# Patient Record
Sex: FEMALE | Race: WHITE | NOT HISPANIC OR LATINO | Employment: FULL TIME | URBAN - METROPOLITAN AREA
[De-identification: names, ages, dates, MRNs, and addresses within clinical notes are randomized per-mention and may not be internally consistent; named-entity substitution may affect disease eponyms.]

---

## 2023-03-14 LAB
CLUE CELLS: PRESENT
NUGENT SCORE: 7
YEAST: PRESENT

## 2023-03-15 LAB
CHLAMYDIA TRACH., AMPLIFIED: NEGATIVE
N. GONORRHEA, AMPLIFIED: NEGATIVE
TRICHOMONAS VAGINALIS: NEGATIVE

## 2023-09-30 PROBLEM — B96.81 HELICOBACTER POSITIVE GASTRITIS: Status: ACTIVE | Noted: 2023-09-30

## 2023-09-30 PROBLEM — M21.40 FLAT FOOT: Status: ACTIVE | Noted: 2023-09-30

## 2023-09-30 PROBLEM — F54 PSYCHOLOGICAL FACTORS AFFECTING MORBID OBESITY (MULTI): Status: ACTIVE | Noted: 2023-09-30

## 2023-09-30 PROBLEM — D50.9 IDA (IRON DEFICIENCY ANEMIA): Status: ACTIVE | Noted: 2023-09-30

## 2023-09-30 PROBLEM — G56.01 CARPAL TUNNEL SYNDROME, RIGHT UPPER LIMB: Status: ACTIVE | Noted: 2023-09-30

## 2023-09-30 PROBLEM — E51.9 THIAMIN DEFICIENCY: Status: ACTIVE | Noted: 2023-09-30

## 2023-09-30 PROBLEM — N92.6 ABNORMAL BLEEDING IN MENSTRUAL CYCLE: Status: ACTIVE | Noted: 2023-09-30

## 2023-09-30 PROBLEM — E78.5 HYPERLIPIDEMIA: Status: ACTIVE | Noted: 2023-09-30

## 2023-09-30 PROBLEM — E66.01 PSYCHOLOGICAL FACTORS AFFECTING MORBID OBESITY (MULTI): Status: ACTIVE | Noted: 2023-09-30

## 2023-09-30 PROBLEM — E55.9 VITAMIN D DEFICIENCY: Status: ACTIVE | Noted: 2023-09-30

## 2023-09-30 PROBLEM — G56.02 CARPAL TUNNEL SYNDROME, LEFT UPPER LIMB: Status: ACTIVE | Noted: 2023-09-30

## 2023-09-30 PROBLEM — R63.8 ALTERATION IN NUTRITION: Status: ACTIVE | Noted: 2023-09-30

## 2023-09-30 PROBLEM — L85.1 ACQUIRED PLANTAR KERATODERMA: Status: ACTIVE | Noted: 2023-09-30

## 2023-09-30 PROBLEM — K29.70 HELICOBACTER POSITIVE GASTRITIS: Status: ACTIVE | Noted: 2023-09-30

## 2023-09-30 PROBLEM — M20.5X9 ACQUIRED CURLY TOE: Status: ACTIVE | Noted: 2023-09-30

## 2023-09-30 PROBLEM — R73.03 PRE-DIABETES: Status: ACTIVE | Noted: 2023-09-30

## 2023-09-30 PROBLEM — G47.30 SLEEP APNEA: Status: ACTIVE | Noted: 2023-09-30

## 2023-09-30 PROBLEM — E66.01 OBESITY, MORBID (MORE THAN 100 LBS OVER IDEAL WEIGHT OR BMI > 40) (MULTI): Status: ACTIVE | Noted: 2023-09-30

## 2023-09-30 PROBLEM — E61.1 IRON DEFICIENCY: Status: ACTIVE | Noted: 2023-09-30

## 2023-09-30 RX ORDER — FERROUS SULFATE 325(65) MG
TABLET ORAL 2 TIMES DAILY
COMMUNITY
Start: 2021-03-26 | End: 2023-10-19 | Stop reason: WASHOUT

## 2023-09-30 RX ORDER — OMEPRAZOLE 20 MG/1
CAPSULE, DELAYED RELEASE ORAL
COMMUNITY
End: 2023-10-19 | Stop reason: WASHOUT

## 2023-09-30 RX ORDER — ASCORBIC ACID 500 MG
TABLET,CHEWABLE ORAL
COMMUNITY
Start: 2021-03-26 | End: 2023-10-19 | Stop reason: WASHOUT

## 2023-09-30 RX ORDER — FLUCONAZOLE 150 MG/1
TABLET ORAL
COMMUNITY
Start: 2023-03-16 | End: 2023-10-19 | Stop reason: ALTCHOICE

## 2023-09-30 RX ORDER — OMEPRAZOLE 40 MG/1
CAPSULE, DELAYED RELEASE ORAL
COMMUNITY
Start: 2021-03-26 | End: 2023-10-19 | Stop reason: WASHOUT

## 2023-09-30 RX ORDER — ERGOCALCIFEROL 1.25 MG/1
CAPSULE ORAL
COMMUNITY
Start: 2021-03-12 | End: 2023-10-19 | Stop reason: WASHOUT

## 2023-09-30 RX ORDER — PNV NO.95/FERROUS FUM/FOLIC AC 28MG-0.8MG
1 TABLET ORAL DAILY
COMMUNITY

## 2023-09-30 RX ORDER — ASCORBIC ACID 250 MG
TABLET,CHEWABLE ORAL 2 TIMES DAILY
COMMUNITY
Start: 2021-03-26 | End: 2023-10-19 | Stop reason: WASHOUT

## 2023-09-30 RX ORDER — VITAMINS AND MINERALS 1; 1000; 100; 400; 30; 3; 3; 15; 20; 12; 7; 200; 29; 20 MG/1; [IU]/1; MG/1; [IU]/1; [IU]/1; MG/1; MG/1; MG/1; MG/1; UG/1; MG/1; MG/1; MG/1; MG/1
TABLET, CHEWABLE ORAL
COMMUNITY
Start: 2021-09-16 | End: 2023-10-19 | Stop reason: WASHOUT

## 2023-09-30 RX ORDER — NORGESTIMATE AND ETHINYL ESTRADIOL 7DAYSX3 28
1 KIT ORAL DAILY
COMMUNITY
End: 2023-10-19 | Stop reason: WASHOUT

## 2023-09-30 RX ORDER — LIDOCAINE HYDROCHLORIDE 20 MG/ML
INJECTION, SOLUTION EPIDURAL; INFILTRATION; INTRACAUDAL; PERINEURAL
COMMUNITY
Start: 2022-04-25 | End: 2023-10-19 | Stop reason: ALTCHOICE

## 2023-09-30 RX ORDER — PSEUDOEPHEDRINE HCL 30 MG
TABLET ORAL 2 TIMES DAILY
COMMUNITY
End: 2023-10-19 | Stop reason: WASHOUT

## 2023-09-30 RX ORDER — METRONIDAZOLE 7.5 MG/G
GEL VAGINAL
COMMUNITY
Start: 2023-03-16 | End: 2023-10-19 | Stop reason: ALTCHOICE

## 2023-10-04 ENCOUNTER — OFFICE VISIT (OUTPATIENT)
Dept: PODIATRY | Facility: CLINIC | Age: 33
End: 2023-10-04
Payer: COMMERCIAL

## 2023-10-04 DIAGNOSIS — M20.5X1 ADDUCTOVARUS ROTATION OF TOE, ACQUIRED, RIGHT: ICD-10-CM

## 2023-10-04 DIAGNOSIS — L84 CORNS AND CALLOSITIES: Primary | ICD-10-CM

## 2023-10-04 PROCEDURE — 99213 OFFICE O/P EST LOW 20 MIN: CPT | Performed by: PODIATRIST

## 2023-10-04 NOTE — PROGRESS NOTES
Chief Complaint   Patient presents with    Foot Callouses     Left foot      Patient presents for care of painful corns bilateral foot.  More pain on the left foot than the right.  Patient is scheduled to have carpal tunnel surgery later this month bilaterally.        Physical Exam  Patient alert, oriented, no acute distress     +2/4 pedal pulses B/L.   Light touch intact B/L.  No cellulitis noted.   Pre-ulcerative, painful, hyperkeratotic lesions located:plantar base 5th metatarsal L foot, and plantar L medial midfoot, medial L hallux, dorsallateral 5th right digit.  Plantar foot lesions have deep course.  Skin quality has improved on the plantar feet bilaterally .  gross motor is intact B/L.  No ulcers noted bilaterally  Lower extremity edema/lymphedema remains    #1 corns/IPK  Debrided all hyperkeratotic tissue.  Continue with OTC foot cream with urea in it daily.  F/U prn.     #2 adductovarus 5th digits  Dispensed gel sleeve and instructed patient on use.   1. Corns and callosities        2. Adductovarus rotation of toe, acquired, right

## 2023-10-05 ENCOUNTER — PREP FOR PROCEDURE (OUTPATIENT)
Dept: ORTHOPEDIC SURGERY | Facility: CLINIC | Age: 33
End: 2023-10-05
Payer: COMMERCIAL

## 2023-10-05 NOTE — H&P
History Of Present Illness  Robert Patrick is a 33 y.o. female presenting with left hand pain and numbness consistent with carpal tunnel syndrome.  She has tried multiple modalities of conservative management.  However she has symptoms which are still affecting her activities of daily living.     Past Medical History  She has a past medical history of Body mass index (BMI) 70 or greater, adult (CMS/[facility]) (03/13/2018), Morbid (severe) obesity due to excess calories (CMS/[facility]) (01/17/2021), and Personal history of other endocrine, nutritional and metabolic disease (10/29/2020).    Surgical History  She has a past surgical history that includes Other surgical history (10/29/2020).     Social History  She has no history on file for tobacco use, alcohol use, and drug use.    Family History  Family History   Problem Relation Name Age of Onset    Other (cardiac disorder) Mother      Other (cardiac disorder) Father          Allergies  Other, Penicillins, Codeine, and Naproxen sodium    Review of Systems     Physical Exam  Patient is in no acute distress.  Exam of her left hand and wrist reveals her skin envelope is intact.  She has a positive Tinel's test, a positive Phalen's test and a positive carpal tunnel compression test.     Last Recorded Vitals  There were no vitals taken for this visit.    Relevant Results      Scheduled medications    Continuous medications    PRN medications    No results found for this or any previous visit (from the past 24 hour(s)).    Assessment/Plan       Patient has known left carpal tunnel syndrome.  She has tried and failed modalities of conservative management.  She has elected to undergo surgery.           Dez Carlson MD

## 2023-10-12 ENCOUNTER — PREP FOR PROCEDURE (OUTPATIENT)
Dept: ORTHOPEDIC SURGERY | Facility: CLINIC | Age: 33
End: 2023-10-12
Payer: COMMERCIAL

## 2023-10-12 NOTE — H&P (VIEW-ONLY)
History Of Present Illness  Robert Patrick is a 33 y.o. female presenting with right hand pain and numbness which was consistent with carpal tunnel syndrome.  She had tried and failed conservative management.  She was having difficulty with activities of daily living because of her symptoms.     Past Medical History  She has a past medical history of Body mass index (BMI) 70 or greater, adult (CMS/[facility]) (03/13/2018), Morbid (severe) obesity due to excess calories (CMS/[facility]) (01/17/2021), and Personal history of other endocrine, nutritional and metabolic disease (10/29/2020).    Surgical History  She has a past surgical history that includes Other surgical history (10/29/2020).     Social History  She has no history on file for tobacco use, alcohol use, and drug use.    Family History  Family History   Problem Relation Name Age of Onset    Other (cardiac disorder) Mother      Other (cardiac disorder) Father          Allergies  Other, Penicillins, Codeine, and Naproxen sodium    Review of Systems     Physical Exam  Patient is in no acute distress.  Exam of her right hand and wrist reveals her skin envelope is intact.  She has a positive Tinel's test, a positive carpal tunnel compression test and a positive Phalen's test.  Last Recorded Vitals  There were no vitals taken for this visit.    Relevant Results      Scheduled medications    Continuous medications    PRN medications    No results found for this or any previous visit (from the past 24 hour(s)).    Assessment/Plan       Patient has known right carpal tunnel syndrome.  She has elected to undergo a right carpal tunnel release after she has failed multiple modalities of conservative management.         Dez Carlson MD

## 2023-10-12 NOTE — H&P
History Of Present Illness  Robert Patrick is a 33 y.o. female presenting with right hand pain and numbness which was consistent with carpal tunnel syndrome.  She had tried and failed conservative management.  She was having difficulty with activities of daily living because of her symptoms.     Past Medical History  She has a past medical history of Body mass index (BMI) 70 or greater, adult (CMS/[facility]) (03/13/2018), Morbid (severe) obesity due to excess calories (CMS/[facility]) (01/17/2021), and Personal history of other endocrine, nutritional and metabolic disease (10/29/2020).    Surgical History  She has a past surgical history that includes Other surgical history (10/29/2020).     Social History  She has no history on file for tobacco use, alcohol use, and drug use.    Family History  Family History   Problem Relation Name Age of Onset    Other (cardiac disorder) Mother      Other (cardiac disorder) Father          Allergies  Other, Penicillins, Codeine, and Naproxen sodium    Review of Systems     Physical Exam  Patient is in no acute distress.  Exam of her right hand and wrist reveals her skin envelope is intact.  She has a positive Tinel's test, a positive carpal tunnel compression test and a positive Phalen's test.  Last Recorded Vitals  There were no vitals taken for this visit.    Relevant Results      Scheduled medications    Continuous medications    PRN medications    No results found for this or any previous visit (from the past 24 hour(s)).    Assessment/Plan       Patient has known right carpal tunnel syndrome.  She has elected to undergo a right carpal tunnel release after she has failed multiple modalities of conservative management.         Dez Carlson MD

## 2023-10-12 NOTE — H&P (VIEW-ONLY)
History Of Present Illness  Robert Patrick is a 33 y.o. female presenting with right hand pain and numbness which was consistent with carpal tunnel syndrome.  She had tried and failed conservative management.  She was having difficulty with activities of daily living because of her symptoms.     Past Medical History  She has a past medical history of Body mass index (BMI) 70 or greater, adult (CMS/[facility]) (03/13/2018), Morbid (severe) obesity due to excess calories (CMS/[facility]) (01/17/2021), and Personal history of other endocrine, nutritional and metabolic disease (10/29/2020).    Surgical History  She has a past surgical history that includes Other surgical history (10/29/2020).     Social History  She has no history on file for tobacco use, alcohol use, and drug use.    Family History  Family History   Problem Relation Name Age of Onset    Other (cardiac disorder) Mother      Other (cardiac disorder) Father          Allergies  Other, Penicillins, Codeine, and Naproxen sodium    Review of Systems     Physical Exam  Patient is in no acute distress.  Exam of her right hand and wrist reveals her skin envelope is intact.  She has a positive Tinel's test, a positive carpal tunnel compression test and a positive Phalen's test.  Last Recorded Vitals  There were no vitals taken for this visit.    Relevant Results      Scheduled medications    Continuous medications    PRN medications    No results found for this or any previous visit (from the past 24 hour(s)).    Assessment/Plan       Patient has known right carpal tunnel syndrome.  She has elected to undergo a right carpal tunnel release after she has failed multiple modalities of conservative management.         Dez Carlson MD

## 2023-10-20 ENCOUNTER — HOSPITAL ENCOUNTER (OUTPATIENT)
Facility: HOSPITAL | Age: 33
Setting detail: OUTPATIENT SURGERY
Discharge: HOME | End: 2023-10-20
Attending: ORTHOPAEDIC SURGERY | Admitting: ORTHOPAEDIC SURGERY
Payer: COMMERCIAL

## 2023-10-20 VITALS
TEMPERATURE: 97.3 F | HEART RATE: 68 BPM | BODY MASS INDEX: 50.02 KG/M2 | DIASTOLIC BLOOD PRESSURE: 58 MMHG | HEIGHT: 64 IN | RESPIRATION RATE: 16 BRPM | OXYGEN SATURATION: 100 % | WEIGHT: 293 LBS | SYSTOLIC BLOOD PRESSURE: 110 MMHG

## 2023-10-20 DIAGNOSIS — G56.02 CARPAL TUNNEL SYNDROME, LEFT UPPER LIMB: Primary | ICD-10-CM

## 2023-10-20 PROCEDURE — 7100000009 HC PHASE TWO TIME - INITIAL BASE CHARGE: Performed by: ORTHOPAEDIC SURGERY

## 2023-10-20 PROCEDURE — 3600000003 HC OR TIME - INITIAL BASE CHARGE - PROCEDURE LEVEL THREE: Performed by: ORTHOPAEDIC SURGERY

## 2023-10-20 PROCEDURE — 2720000007 HC OR 272 NO HCPCS: Performed by: ORTHOPAEDIC SURGERY

## 2023-10-20 PROCEDURE — 7100000010 HC PHASE TWO TIME - EACH INCREMENTAL 1 MINUTE: Performed by: ORTHOPAEDIC SURGERY

## 2023-10-20 PROCEDURE — 96372 THER/PROPH/DIAG INJ SC/IM: CPT | Performed by: ORTHOPAEDIC SURGERY

## 2023-10-20 PROCEDURE — 64721 CARPAL TUNNEL SURGERY: CPT | Performed by: ORTHOPAEDIC SURGERY

## 2023-10-20 PROCEDURE — 3600000008 HC OR TIME - EACH INCREMENTAL 1 MINUTE - PROCEDURE LEVEL THREE: Performed by: ORTHOPAEDIC SURGERY

## 2023-10-20 PROCEDURE — 2500000005 HC RX 250 GENERAL PHARMACY W/O HCPCS: Performed by: ORTHOPAEDIC SURGERY

## 2023-10-20 RX ORDER — BUPIVACAINE HYDROCHLORIDE 5 MG/ML
INJECTION, SOLUTION PERINEURAL AS NEEDED
Status: DISCONTINUED | OUTPATIENT
Start: 2023-10-20 | End: 2023-10-20 | Stop reason: HOSPADM

## 2023-10-20 ASSESSMENT — COLUMBIA-SUICIDE SEVERITY RATING SCALE - C-SSRS
2. HAVE YOU ACTUALLY HAD ANY THOUGHTS OF KILLING YOURSELF?: NO
6. HAVE YOU EVER DONE ANYTHING, STARTED TO DO ANYTHING, OR PREPARED TO DO ANYTHING TO END YOUR LIFE?: NO
1. IN THE PAST MONTH, HAVE YOU WISHED YOU WERE DEAD OR WISHED YOU COULD GO TO SLEEP AND NOT WAKE UP?: NO

## 2023-10-20 ASSESSMENT — PAIN SCALES - GENERAL: PAINLEVEL_OUTOF10: 0 - NO PAIN

## 2023-10-20 ASSESSMENT — PAIN - FUNCTIONAL ASSESSMENT
PAIN_FUNCTIONAL_ASSESSMENT: 0-10
PAIN_FUNCTIONAL_ASSESSMENT: 0-10

## 2023-10-20 NOTE — BRIEF OP NOTE
Date: 10/20/2023  OR Location: [facility] OR    Name: Robert Patrick, : 1990, Age: 33 y.o., MRN: [id number], Sex: female    Diagnosis  Pre-op Diagnosis     * Carpal tunnel syndrome, left upper limb [G56.02] Post-op Diagnosis     * Carpal tunnel syndrome, left upper limb [G56.02]     Procedures  LEFT CARPAL TUNNEL RELEASE  30355 - [place] NEUROPLASTY &/TRANSPOS MEDIAN NRV CARPAL TUNNE      Surgeons      * Dez Carlson - Primary    Resident/Fellow/Other Assistant:  Surgeon(s) and Role:    Procedure Summary  Anesthesia: Local  ASA: ASA status not filed in the log.  Anesthesia Staff: No anesthesia staff entered.  Estimated Blood Loss: 1mL  Intra-op Medications: * No intraprocedure medications in log *           Anesthesia Record               Intraprocedure I/O Totals       None           Specimen: No specimens collected     Staff:   Circulator: Melinda Esteban RN  Relief Scrub: Ramona Zelaya RN  Scrub Person: Ronda Jaime RN          Findings: Thickened transverse carpal ligament    Complications:  None; patient tolerated the procedure well.     Disposition: PACU - hemodynamically stable.  Condition: stable  Specimens Collected: No specimens collected  Attending Attestation: I performed the procedure.    Dez Carlson  Phone Number: [phone]

## 2023-10-20 NOTE — OP NOTE
LEFT CARPAL TUNNEL RELEASE (L) Operative Note     Date: 10/20/2023  OR Location: [facility]    Name: Robert Patrick, : 1990, Age: 33 y.o., MRN: [id number], Sex: female    Diagnosis  Pre-op Diagnosis     * Carpal tunnel syndrome, left upper limb [G56.02] Post-op Diagnosis     * Carpal tunnel syndrome, left upper limb [G56.02]     Procedures  LEFT CARPAL TUNNEL RELEASE  25835 - [place] NEUROPLASTY &/TRANSPOS MEDIAN NRV CARPAL TUNNE      Surgeons      * Dez Carlson - Primary    Resident/Fellow/Other Assistant:  Surgeon(s) and Role:    Procedure Summary  Anesthesia: Local  ASA: ASA status not filed in the log.  Anesthesia Staff: No anesthesia staff entered.  Estimated Blood Loss: 1mL  Intra-op Medications: * No intraprocedure medications in log *           Anesthesia Record               Intraprocedure I/O Totals       None           Specimen: No specimens collected     Staff:   Circulator: Melinda Esteban RN  Relief Scrub: Ramona Zelaya RN  Scrub Person: Ronda Jaime RN         Drains and/or Catheters: * None in log *    Tourniquet Times:   5 minutes at 250 mmHg on left forearm    Implants:     Findings: Thickened transverse carpal ligament    Indications: Robert Patrick is an 33 y.o. female who is having surgery for Carpal tunnel syndrome, left upper limb [G56.02].  Patient had presented with left hand pain and numbness consistent with carpal tunnel syndrome.  She had electrodiagnostic tests which are confirmatory of it.  She had tried and failed conservative management.  We then discussed surgical treatment options including a left carpal tunnel release in detail.  I discussed with her the risk, benefits and alternatives of this procedure.  I explained her that the risk of the procedure include but not limited to infection, damage to nerves, tendons and blood vessels, continued numbness, postoperative pain and stiffness, as well as risks associate with anesthesia.  The patient voiced understanding and informed  consent was obtained.    The patient was seen in the preoperative area. The risks, benefits, complications, treatment options, non-operative alternatives, expected recovery and outcomes were discussed with the patient. The possibilities of reaction to medication, pulmonary aspiration, injury to surrounding structures, bleeding, recurrent infection, the need for additional procedures, failure to diagnose a condition, and creating a complication requiring transfusion or operation were discussed with the patient. The patient concurred with the proposed plan, giving informed consent.  The site of surgery was properly noted/marked if necessary per policy. The patient has been actively warmed in preoperative area. Preoperative antibiotics are not indicated. Venous thrombosis prophylaxis are not indicated.    Procedure Details: Patient was prepped identified in the preoperative waiting area and her left hand was marked as site of surgery.  Patient was taken back to the operating room suite placed supine on the OR table.  A nonsterile tourniquet was applied to the patient's left forearm.  A preoperative verification timeout was taken.  I then injected 10 mL of half percent plain Marcaine in line with my proposed incision site.  Patient's left upper extremity was then prepped and draped in the usual sterile fashion.  Patient's left upper extremity was exsanguinated with an Esmarch bandage and a tourniquet inflated to 250 mmHg.  I then made approximately 1.5 inch longitudinal incision into the base of the palm in line with the third webspace.  Sharp dissection was carried through skin and subcutaneous tissue.  Identified the palmar fascia and sharply split this.  I then identified the transverse carpal ligament and sharply transected it with a 15 blade.  I released the antebrachial fascia proximally.  I confirmed full decompression in the median nerve.  Wound was irrigated with normal saline.  Skin was closed with 4-0 nylon  suture.  Tourniquet was let down after 5 minutes.  Good vascular return was seen the patient's left hand and all digits.  A sterile dressing consisting of Xeroform, gauze 4 x 4's, Webril and Ace wrap was applied to the patient's left hand.  Patient was brought back to recovery room in stable condition.  There were no complications during the case.  Complications:  None; patient tolerated the procedure well.    Disposition: PACU - hemodynamically stable.  Condition: stable         Additional Details: none    Attending Attestation: I performed the procedure.    Dez Carlson  Phone Number: [phone]

## 2023-10-30 ENCOUNTER — OFFICE VISIT (OUTPATIENT)
Dept: ORTHOPEDIC SURGERY | Facility: CLINIC | Age: 33
End: 2023-10-30
Payer: COMMERCIAL

## 2023-10-30 VITALS — WEIGHT: 293 LBS | BODY MASS INDEX: 50.02 KG/M2 | HEIGHT: 64 IN

## 2023-10-30 DIAGNOSIS — G56.02 CARPAL TUNNEL SYNDROME ON LEFT: Primary | ICD-10-CM

## 2023-10-30 PROCEDURE — 99024 POSTOP FOLLOW-UP VISIT: CPT | Performed by: ORTHOPAEDIC SURGERY

## 2023-10-30 PROCEDURE — 1036F TOBACCO NON-USER: CPT | Performed by: ORTHOPAEDIC SURGERY

## 2023-10-30 NOTE — PROGRESS NOTES
Subjective    Patient ID: Robert Patrick is a 33 y.o. female.    Chief Complaint: OTHER (POSTOP CHECK LT CTR/DOS 10/20/23)     Last Surgery: Left Carpal Tunnel Release - Left  Last Surgery Date: 10/20/2023    HPI  Patient comes in for postoperative visit after undergoing a left carpal tunnel release.  She states she has had some decreased numbness in her left hand.  Her pain has been well controlled.    Objective   Ortho Exam  Patient is in no acute distress.  Exam of her left hand reveals her incision is healed well.  There is no evidence of infection.  Sutures removed.  She has adequate range of motion in her fingers.    Assessment/Plan   Encounter Diagnoses    Left carpal tunnel syndrome  Patient is doing satisfactory following her left carpal tunnel release.  She was reassured takes more than just 2 weeks for the nerve to heal.  She is scheduled for a right carpal tunnel release in 2 weeks.  She may use her left hand is much as she can tolerate.

## 2023-11-10 ENCOUNTER — HOSPITAL ENCOUNTER (OUTPATIENT)
Facility: HOSPITAL | Age: 33
Setting detail: OUTPATIENT SURGERY
Discharge: HOME | End: 2023-11-10
Attending: ORTHOPAEDIC SURGERY | Admitting: ORTHOPAEDIC SURGERY
Payer: COMMERCIAL

## 2023-11-10 VITALS
DIASTOLIC BLOOD PRESSURE: 84 MMHG | OXYGEN SATURATION: 100 % | HEIGHT: 64 IN | SYSTOLIC BLOOD PRESSURE: 113 MMHG | RESPIRATION RATE: 16 BRPM | WEIGHT: 293 LBS | BODY MASS INDEX: 50.02 KG/M2 | HEART RATE: 67 BPM | TEMPERATURE: 97.9 F

## 2023-11-10 DIAGNOSIS — G56.01 CARPAL TUNNEL SYNDROME, RIGHT UPPER LIMB: Primary | ICD-10-CM

## 2023-11-10 PROCEDURE — 7100000009 HC PHASE TWO TIME - INITIAL BASE CHARGE: Performed by: ORTHOPAEDIC SURGERY

## 2023-11-10 PROCEDURE — 2500000005 HC RX 250 GENERAL PHARMACY W/O HCPCS: Performed by: ORTHOPAEDIC SURGERY

## 2023-11-10 PROCEDURE — 2720000007 HC OR 272 NO HCPCS: Performed by: ORTHOPAEDIC SURGERY

## 2023-11-10 PROCEDURE — A4217 STERILE WATER/SALINE, 500 ML: HCPCS | Performed by: ORTHOPAEDIC SURGERY

## 2023-11-10 PROCEDURE — 3600000008 HC OR TIME - EACH INCREMENTAL 1 MINUTE - PROCEDURE LEVEL THREE: Performed by: ORTHOPAEDIC SURGERY

## 2023-11-10 PROCEDURE — 2500000004 HC RX 250 GENERAL PHARMACY W/ HCPCS (ALT 636 FOR OP/ED): Performed by: ORTHOPAEDIC SURGERY

## 2023-11-10 PROCEDURE — 7100000010 HC PHASE TWO TIME - EACH INCREMENTAL 1 MINUTE: Performed by: ORTHOPAEDIC SURGERY

## 2023-11-10 PROCEDURE — 3600000003 HC OR TIME - INITIAL BASE CHARGE - PROCEDURE LEVEL THREE: Performed by: ORTHOPAEDIC SURGERY

## 2023-11-10 PROCEDURE — 64721 CARPAL TUNNEL SURGERY: CPT | Performed by: ORTHOPAEDIC SURGERY

## 2023-11-10 RX ORDER — BISMUTH SUBSALICYLATE 262 MG
1 TABLET,CHEWABLE ORAL DAILY
COMMUNITY

## 2023-11-10 RX ORDER — SODIUM CHLORIDE 0.9 G/100ML
IRRIGANT IRRIGATION AS NEEDED
Status: DISCONTINUED | OUTPATIENT
Start: 2023-11-10 | End: 2023-11-10 | Stop reason: HOSPADM

## 2023-11-10 RX ORDER — BUPIVACAINE HYDROCHLORIDE 5 MG/ML
10 INJECTION, SOLUTION EPIDURAL; INTRACAUDAL ONCE
Status: CANCELLED | OUTPATIENT
Start: 2023-11-10 | End: 2023-11-10

## 2023-11-10 RX ORDER — BUPIVACAINE HYDROCHLORIDE 5 MG/ML
INJECTION, SOLUTION PERINEURAL AS NEEDED
Status: DISCONTINUED | OUTPATIENT
Start: 2023-11-10 | End: 2023-11-10 | Stop reason: HOSPADM

## 2023-11-10 ASSESSMENT — PAIN SCALES - GENERAL
PAINLEVEL_OUTOF10: 0 - NO PAIN
PAINLEVEL_OUTOF10: 0 - NO PAIN

## 2023-11-10 ASSESSMENT — PAIN - FUNCTIONAL ASSESSMENT
PAIN_FUNCTIONAL_ASSESSMENT: 0-10
PAIN_FUNCTIONAL_ASSESSMENT: 0-10

## 2023-11-10 NOTE — OP NOTE
RIGHT CARPAL TUNNEL RELEASE (R) Operative Note     Date: 11/10/2023  OR Location: [facility]    Name: Robert Patrick, : 1990, Age: 33 y.o., MRN: [id number], Sex: female    Diagnosis  Pre-op Diagnosis     * Carpal tunnel syndrome, right upper limb [G56.01] Post-op Diagnosis     * Carpal tunnel syndrome, right upper limb [G56.01]     Procedures    * RIGHT CARPAL TUNNEL RELEASE    Surgeons      * Dez Carlson - Primary    Resident/Fellow/Other Assistant:  Surgeon(s) and Role:    Procedure Summary  Anesthesia: Local  ASA: ASA status not filed in the log.  Anesthesia Staff: No anesthesia staff entered.  Estimated Blood Loss: 1 mL  Intra-op Medications:   Medication Name Total Dose   sodium chloride 0.9 % irrigation solution 1,000 mL              Anesthesia Record               Intraprocedure I/O Totals       None           Specimen: No specimens collected     Staff:   Circulator: Giuliana Nance RN  Scrub Person: Radha Hill         Drains and/or Catheters: * None in log *    Tourniquet Times:     Total Tourniquet Time Documented:  Arm - Lower (Right) - 7 minutes  Total: Arm - Lower (Right) - 7 minutes      Implants:     Findings: Thickened transverse carpal ligament    Indications: Robert Patrick is an 33 y.o. female who is having surgery for Carpal tunnel syndrome, right upper limb [G56.01].  Patient had right hand pain and numbness consistent with carpal tunnel syndrome.  She had electrodiagnostic tests which are confirmatory of it.  She had tried and failed conservative management.  We then discussed surgical treatment options including a right carpal tunnel release.  I discussed with her in detail the risk, benefits alternatives of a right carpal tunnel release.  I explained her that the risks of the procedure include but are not limited to infection, damage to nerves and blood vessels, postoperative pain and stiffness, continued numbness, as well as risks associate with anesthesia.  The patient voiced  understanding and informed consent was obtained.    The patient was seen in the preoperative area. The risks, benefits, complications, treatment options, non-operative alternatives, expected recovery and outcomes were discussed with the patient. The possibilities of reaction to medication, pulmonary aspiration, injury to surrounding structures, bleeding, recurrent infection, the need for additional procedures, failure to diagnose a condition, and creating a complication requiring transfusion or operation were discussed with the patient. The patient concurred with the proposed plan, giving informed consent.  The site of surgery was properly noted/marked if necessary per policy. The patient has been actively warmed in preoperative area. Preoperative antibiotics are not indicated. Venous thrombosis prophylaxis are not indicated.    Procedure Details: Patient was prepped identified in the preoperative waiting area and her right hand was marked as site of surgery.  Patient was taken back to the operating room suite and placed supine on the OR table.  A nonsterile tourniquet was applied to the patient's right forearm.  A preoperative verification timeout was taken.  At this point I injected 10 mL of half percent plain Marcaine in line with my proposed incision site.  Patient's right upper extremity was then prepped and draped in the usual sterile fashion.  Patient's right upper extremity was exsanguinated with an Esmarch bandage and a tourniquet inflated to 250 mmHg.  A 1.5 inch longitudinal incision was then made in the palm in line with the third webspace.  Sharp dissection was carried through skin and subcutaneous tissue.  Electrocautery was used chief pneumostasis.  Identified the palmar fascia and sharply split this.  I then identified the transverse carpal ligament and sharply transected it with a 15 blade.  I released the antebrachial fascia proximally.  I confirmed full decompression of the median nerve.  Wound  was irrigated with normal saline.  Skin was closed with 4-0 nylon suture.  Tourniquet was let down after 7 minutes.  Good vascular return was seen of the patient's right hand and all digits.  A sterile dressing consisting of Xeroform gauze 4 x 4's Webril and Ace wrap was applied to the patient's right hand.  Patient was brought back to recovery room in stable condition.  There were no complications during the case.  All sponge and needle counts were correct at the end of the case.  Complications:  None; patient tolerated the procedure well.    Disposition: PACU - hemodynamically stable.  Condition: stable         Additional Details: None    Attending Attestation: I performed the procedure.    Dez Carlson  Phone Number: [phone]

## 2023-11-10 NOTE — BRIEF OP NOTE
Date: 11/10/2023  OR Location: [facility]    Name: Robert Patrick, : 1990, Age: 33 y.o., MRN: [id number], Sex: female    Diagnosis  Pre-op Diagnosis     * Carpal tunnel syndrome, right upper limb [G56.01] Post-op Diagnosis     * Carpal tunnel syndrome, right upper limb [G56.01]     Procedures    * RIGHT CARPAL TUNNEL RELEASE    Surgeons      * Dez Carlson - Primary    Resident/Fellow/Other Assistant:  Surgeon(s) and Role:    Procedure Summary  Anesthesia: Local  ASA: ASA status not filed in the log.  Anesthesia Staff: No anesthesia staff entered.  Estimated Blood Loss: 1 mL  Intra-op Medications:   Medication Name Total Dose   sodium chloride 0.9 % irrigation solution 1,000 mL              Anesthesia Record               Intraprocedure I/O Totals       None           Specimen: No specimens collected     Staff:   Circulator: Giuliana Nance RN  Scrub Person: Radha Hill          Findings: Thickened transverse carpal ligament    Complications:  None; patient tolerated the procedure well.     Disposition: PACU - hemodynamically stable.  Condition: stable  Specimens Collected: No specimens collected  Attending Attestation: I performed the procedure.    Dez Carlson  Phone Number: [phone]

## 2023-11-13 ENCOUNTER — OFFICE VISIT (OUTPATIENT)
Dept: PRIMARY CARE | Facility: CLINIC | Age: 33
End: 2023-11-13
Payer: COMMERCIAL

## 2023-11-13 VITALS
HEART RATE: 76 BPM | RESPIRATION RATE: 18 BRPM | TEMPERATURE: 96.1 F | HEIGHT: 64 IN | BODY MASS INDEX: 50.02 KG/M2 | SYSTOLIC BLOOD PRESSURE: 122 MMHG | DIASTOLIC BLOOD PRESSURE: 70 MMHG | WEIGHT: 293 LBS

## 2023-11-13 DIAGNOSIS — F41.9 ANXIETY: ICD-10-CM

## 2023-11-13 DIAGNOSIS — Z00.00 PHYSICAL EXAM: Primary | ICD-10-CM

## 2023-11-13 DIAGNOSIS — E66.01 CLASS 3 SEVERE OBESITY DUE TO EXCESS CALORIES WITHOUT SERIOUS COMORBIDITY WITH BODY MASS INDEX (BMI) OF 50.0 TO 59.9 IN ADULT (MULTI): ICD-10-CM

## 2023-11-13 PROCEDURE — 1036F TOBACCO NON-USER: CPT | Performed by: NURSE PRACTITIONER

## 2023-11-13 PROCEDURE — 90686 IIV4 VACC NO PRSV 0.5 ML IM: CPT | Performed by: NURSE PRACTITIONER

## 2023-11-13 PROCEDURE — 90471 IMMUNIZATION ADMIN: CPT | Performed by: NURSE PRACTITIONER

## 2023-11-13 PROCEDURE — 3008F BODY MASS INDEX DOCD: CPT | Performed by: NURSE PRACTITIONER

## 2023-11-13 PROCEDURE — 99395 PREV VISIT EST AGE 18-39: CPT | Performed by: NURSE PRACTITIONER

## 2023-11-13 RX ORDER — SERTRALINE HYDROCHLORIDE 25 MG/1
25 TABLET, FILM COATED ORAL DAILY
Qty: 30 TABLET | Refills: 1 | Status: SHIPPED | OUTPATIENT
Start: 2023-11-13 | End: 2024-01-12

## 2023-11-13 ASSESSMENT — ENCOUNTER SYMPTOMS
ABDOMINAL PAIN: 0
PALPITATIONS: 0
SHORTNESS OF BREATH: 0
NERVOUS/ANXIOUS: 1
SLEEP DISTURBANCE: 0

## 2023-11-13 ASSESSMENT — ANXIETY QUESTIONNAIRES
4. TROUBLE RELAXING: MORE THAN HALF THE DAYS
7. FEELING AFRAID AS IF SOMETHING AWFUL MIGHT HAPPEN: MORE THAN HALF THE DAYS
5. BEING SO RESTLESS THAT IT IS HARD TO SIT STILL: SEVERAL DAYS
GAD7 TOTAL SCORE: 14
1. FEELING NERVOUS, ANXIOUS, OR ON EDGE: SEVERAL DAYS
IF YOU CHECKED OFF ANY PROBLEMS ON THIS QUESTIONNAIRE, HOW DIFFICULT HAVE THESE PROBLEMS MADE IT FOR YOU TO DO YOUR WORK, TAKE CARE OF THINGS AT HOME, OR GET ALONG WITH OTHER PEOPLE: SOMEWHAT DIFFICULT
2. NOT BEING ABLE TO STOP OR CONTROL WORRYING: NEARLY EVERY DAY
6. BECOMING EASILY ANNOYED OR IRRITABLE: NEARLY EVERY DAY
3. WORRYING TOO MUCH ABOUT DIFFERENT THINGS: MORE THAN HALF THE DAYS

## 2023-11-13 ASSESSMENT — PAIN SCALES - GENERAL: PAINLEVEL: 0-NO PAIN

## 2023-11-13 ASSESSMENT — PATIENT HEALTH QUESTIONNAIRE - PHQ9
2. FEELING DOWN, DEPRESSED OR HOPELESS: MORE THAN HALF THE DAYS
SUM OF ALL RESPONSES TO PHQ9 QUESTIONS 1 AND 2: 2
1. LITTLE INTEREST OR PLEASURE IN DOING THINGS: NOT AT ALL

## 2023-11-13 NOTE — PROGRESS NOTES
"Subjective   Patient ID: Robert Patrick is a 33 y.o. female who presents for Annual Exam (Depression /anxiety. Discuss weight loss).    HPI   Patient presents to clinic for annual visit.  Recent history of right carpal tunnel surgery 11/10/2023.    Patient complains of increased anxiety and depression symptoms.  She states she is having a lot of family problems.  Declines suicidal or homicidal thoughts today.    Patient also has concerns about her weight.  Current weight is 345 pounds BMI 59.25.  Patient states she had weight loss surgery in 2017 here at .  She states she does not want another surgery but would like to discuss other options for weight loss.    Appetite normal bowel and bladder normal.    Gynecology visits up-to-date.    Influenza vaccine updated today in clinic.    Review of Systems   Respiratory:  Negative for shortness of breath.    Cardiovascular:  Negative for chest pain and palpitations.   Gastrointestinal:  Negative for abdominal pain.   Psychiatric/Behavioral:  Negative for sleep disturbance and suicidal ideas. The patient is nervous/anxious.    All other systems reviewed and are negative.      Objective   /70 (BP Location: Right arm, Patient Position: Sitting, BP Cuff Size: Large adult)   Pulse 76   Temp 35.6 °C (96.1 °F) (Temporal)   Resp 18   Ht 1.626 m (5' 4\")   Wt (!) 157 kg (345 lb 3.2 oz)   BMI 59.25 kg/m²     Physical Exam  Vitals reviewed.   Constitutional:       Appearance: Normal appearance. She is obese. She is not ill-appearing or toxic-appearing.   HENT:      Right Ear: Tympanic membrane and external ear normal.      Left Ear: Tympanic membrane and external ear normal.      Mouth/Throat:      Mouth: Mucous membranes are moist.      Pharynx: Oropharynx is clear.   Eyes:      Pupils: Pupils are equal, round, and reactive to light.   Neck:      Thyroid: No thyroid mass, thyromegaly or thyroid tenderness.   Cardiovascular:      Rate and Rhythm: Normal rate and regular " rhythm.   Pulmonary:      Effort: Pulmonary effort is normal.      Breath sounds: Normal breath sounds.   Abdominal:      General: Bowel sounds are normal.      Palpations: Abdomen is soft.      Tenderness: There is no abdominal tenderness. There is no guarding.   Musculoskeletal:         General: Normal range of motion.      Cervical back: Normal range of motion and neck supple.   Skin:     General: Skin is warm and dry.   Neurological:      Mental Status: She is alert and oriented to person, place, and time.   Psychiatric:         Mood and Affect: Mood normal.         Thought Content: Thought content normal.         Judgment: Judgment normal.         Assessment/Plan   Problem List Items Addressed This Visit    None  Visit Diagnoses       Physical exam    -  Primary    Relevant Orders    CBC    Comprehensive Metabolic Panel    Lipid Panel    Hemoglobin A1C    Vitamin D 25-Hydroxy,Total (for eval of Vitamin D levels)    Tsh With Reflex To Free T4 If Abnormal    Class 3 severe obesity due to excess calories without serious comorbidity with body mass index (BMI) of 50.0 to 59.9 in adult (CMS/[facility])        Relevant Orders    Referral to [facility]    Anxiety        Relevant Medications    sertraline (Zoloft) 25 mg tablet    Other Relevant Orders    Referral to Psychiatry  Follow-up 6 weeks  To ER if symptoms worsen

## 2023-11-13 NOTE — PATIENT INSTRUCTIONS
START SERTRALINE as PRESCRIBED.  Healthy diet and drink plenty of water.  Please have labs drawn-You will be notified with abnormal results only.    SEE MY CHART FOR RESULTS- If you have any questions please do not hesitate to notify our office.    Please schedule all necessary health screenings ophthalmology and dentist.    Follow-up 6 WEEKS FOR VIRTUAL VISIT.     Call office any new concerns.  TO ER IF NEEDED FOR ANXIETY/DEPRESSION SYMPTOMS.

## 2023-11-20 ENCOUNTER — OFFICE VISIT (OUTPATIENT)
Dept: ORTHOPEDIC SURGERY | Facility: CLINIC | Age: 33
End: 2023-11-20
Payer: COMMERCIAL

## 2023-11-20 VITALS — HEIGHT: 64 IN | WEIGHT: 293 LBS | BODY MASS INDEX: 50.02 KG/M2

## 2023-11-20 DIAGNOSIS — G56.01 CARPAL TUNNEL SYNDROME ON RIGHT: Primary | ICD-10-CM

## 2023-11-20 PROCEDURE — 3008F BODY MASS INDEX DOCD: CPT | Performed by: ORTHOPAEDIC SURGERY

## 2023-11-20 PROCEDURE — 1036F TOBACCO NON-USER: CPT | Performed by: ORTHOPAEDIC SURGERY

## 2023-11-20 PROCEDURE — 99024 POSTOP FOLLOW-UP VISIT: CPT | Performed by: ORTHOPAEDIC SURGERY

## 2023-11-20 NOTE — PROGRESS NOTES
Subjective    Patient ID: Robert Patrick is a 33 y.o. female.    Chief Complaint: OTHER (POST OP CHECK RT CTR/DOS: 11/10/23/)     Last Surgery: Right Carpal Tunnel Release - Right  Last Surgery Date: 11/10/2023    HPI  Comes in for her right carpal tunnel release postop visit.  She currently states she has no paresthesias in her right hand.  She has had very little pain at the incision site itself.    Objective   Ortho Exam  Patient is in no acute distress.  Exam of her right hand reveals her incision has healed well.  There is no evidence of infection.  Sutures were removed.  She has full range of motion in her fingers.        Assessment/Plan   Encounter Diagnoses:  Carpal tunnel syndrome on right    Patient is doing satisfactory following her right carpal tunnel release.  She may use her right hand is much as she can tolerate.  She will follow-up as her symptoms dictate.

## 2024-01-09 ENCOUNTER — TELEMEDICINE (OUTPATIENT)
Dept: PRIMARY CARE | Facility: CLINIC | Age: 34
End: 2024-01-09
Payer: COMMERCIAL

## 2024-01-09 DIAGNOSIS — F41.9 ANXIETY: Primary | ICD-10-CM

## 2024-01-09 PROCEDURE — 99212 OFFICE O/P EST SF 10 MIN: CPT | Performed by: NURSE PRACTITIONER

## 2024-01-09 NOTE — PROGRESS NOTES
Subjective   Patient ID: Robert Patrick is a 33 y.o. female who presents for No chief complaint on file..    HPI     Patient seen via virtual visit.  Patient states she is doing much better anxiety and depression symptoms have improved since starting on sertraline.  She states she is been focusing better and is more levelheaded.  Patient is currently working on weight loss states she has lost 10 pounds has been going to the gym has changed her diet eating more protein and feeling much better.    Review of Systems    Virtual visit see HPI    Objective   There were no vitals taken for this visit.    Physical Exam    Virtual visit patient appears well smiling no acute distress noted.     Assessment/Plan   Problem List Items Addressed This Visit    None  Visit Diagnoses       Anxiety    -  Primary  -Improved  -Continue sertraline 25 mg daily.  -Follow-up 6 months sooner if needed.

## 2024-01-12 DIAGNOSIS — F41.9 ANXIETY: ICD-10-CM

## 2024-01-12 RX ORDER — SERTRALINE HYDROCHLORIDE 25 MG/1
25 TABLET, FILM COATED ORAL DAILY
Qty: 90 TABLET | Refills: 1 | Status: SHIPPED | OUTPATIENT
Start: 2024-01-12

## 2024-01-12 NOTE — TELEPHONE ENCOUNTER
Rx Refill Request Telephone Encounter    Name:  Robert Patrick  :  [id number]  Medication Name:    Requested Prescriptions     Pending Prescriptions Disp Refills    sertraline (Zoloft) 25 mg tablet [Pharmacy Med Name: SERTRALINE 25MG TABLETS] 90 tablet 1     Sig: TAKE 1 TABLET BY MOUTH DAILY   Specific Pharmacy location:    [facility] #[id number] [address] [address]  Phone: [phone] Fax: [phone]  Date of last appointment:  2023  Date of next appointment:  None scheduled  Best number to reach patient:  [phone] (home) [phone] (work)

## 2024-01-16 ENCOUNTER — APPOINTMENT (OUTPATIENT)
Dept: OBSTETRICS AND GYNECOLOGY | Facility: CLINIC | Age: 34
End: 2024-01-16
Payer: COMMERCIAL

## 2024-02-05 ENCOUNTER — OFFICE VISIT (OUTPATIENT)
Dept: OBSTETRICS AND GYNECOLOGY | Facility: CLINIC | Age: 34
End: 2024-02-05
Payer: COMMERCIAL

## 2024-02-05 VITALS
HEIGHT: 64 IN | WEIGHT: 293 LBS | SYSTOLIC BLOOD PRESSURE: 128 MMHG | BODY MASS INDEX: 50.02 KG/M2 | DIASTOLIC BLOOD PRESSURE: 85 MMHG | HEART RATE: 65 BPM | OXYGEN SATURATION: 99 %

## 2024-02-05 DIAGNOSIS — Z01.419 ENCOUNTER FOR ANNUAL ROUTINE GYNECOLOGICAL EXAMINATION: Primary | ICD-10-CM

## 2024-02-05 DIAGNOSIS — Z11.3 SCREEN FOR STD (SEXUALLY TRANSMITTED DISEASE): ICD-10-CM

## 2024-02-05 PROCEDURE — 3008F BODY MASS INDEX DOCD: CPT | Performed by: OBSTETRICS & GYNECOLOGY

## 2024-02-05 PROCEDURE — 87800 DETECT AGNT MULT DNA DIREC: CPT

## 2024-02-05 PROCEDURE — 1036F TOBACCO NON-USER: CPT | Performed by: OBSTETRICS & GYNECOLOGY

## 2024-02-05 PROCEDURE — 99395 PREV VISIT EST AGE 18-39: CPT | Performed by: OBSTETRICS & GYNECOLOGY

## 2024-02-05 NOTE — PROGRESS NOTES
Subjective   Patient ID: Robert Patrick is a 33 y.o. female who presents for Annual Exam.  HPI  Patient is a 33-year-old  2 para 0-0-2-0 female whose last menstrual period was 2024 she said they are typically 1 month apart she will bleed for about 3 to 4 days currently is not sexually active and does not use anything for birth control.  She admits to regular bowel movements denies any urinary symptoms.  Last normal Pap smear 2020.  Patient history of positive gonorrhea and Chlamydia cultures which were treated and test of cure was negative.  Will reculture today.  Review of Systems    Objective   Physical Exam  Gen.: Alert and in no acute distress. Well-developed, well-nourished.  Thyroid: Nonenlarged and no palpable thyroid nodules  Cardiovascular: Heart regular rate and rhythm  Pulmonary: Clear bilateral breath sounds  Breasts: Normal appearance, no nipple discharge and no skin changes. No palpable masses and no axillary lymphadenopathy  Abdomen: Obese, soft and nontender, no abdominal mass palpated, no organomegaly   Pelvic: External genitalia normal, vagina normal rugated good tone cervix is clean.  Uterus and adnexa are difficult to examine secondary to patient's BMI however no obvious masses or tenderness.    Assessment/Plan   Annual GYN exam.  Pap and HPV not needed today.  Repeat STD screening, patient has lost 12 pounds recently, is walking regularly has modified her diet.  She will follow-up in 1 year or as needed.         Tenzin Hsieh MD 24 1:27 PM

## 2024-02-06 LAB
C TRACH RRNA SPEC QL NAA+PROBE: NEGATIVE
N GONORRHOEA DNA SPEC QL PROBE+SIG AMP: NEGATIVE

## 2024-02-07 NOTE — RESULT ENCOUNTER NOTE
Segundo Jones,    Just wanted to let you know that your Gonorrhea and Chlamydia cultures came back negative. Have a good day!

## 2024-04-17 ENCOUNTER — OFFICE VISIT (OUTPATIENT)
Dept: PRIMARY CARE | Facility: CLINIC | Age: 34
End: 2024-04-17
Payer: COMMERCIAL

## 2024-04-17 ENCOUNTER — LAB (OUTPATIENT)
Dept: LAB | Facility: LAB | Age: 34
End: 2024-04-17
Payer: COMMERCIAL

## 2024-04-17 VITALS
RESPIRATION RATE: 18 BRPM | DIASTOLIC BLOOD PRESSURE: 76 MMHG | BODY MASS INDEX: 58.36 KG/M2 | HEART RATE: 74 BPM | WEIGHT: 293 LBS | OXYGEN SATURATION: 98 % | SYSTOLIC BLOOD PRESSURE: 118 MMHG | TEMPERATURE: 97.3 F

## 2024-04-17 DIAGNOSIS — Z90.3 S/P GASTRIC SLEEVE PROCEDURE: ICD-10-CM

## 2024-04-17 DIAGNOSIS — E66.01 CLASS 3 SEVERE OBESITY DUE TO EXCESS CALORIES WITHOUT SERIOUS COMORBIDITY WITH BODY MASS INDEX (BMI) OF 50.0 TO 59.9 IN ADULT (MULTI): ICD-10-CM

## 2024-04-17 DIAGNOSIS — Z00.00 PHYSICAL EXAM: ICD-10-CM

## 2024-04-17 DIAGNOSIS — F41.9 ANXIETY: ICD-10-CM

## 2024-04-17 DIAGNOSIS — Z00.00 PHYSICAL EXAM: Primary | ICD-10-CM

## 2024-04-17 PROBLEM — E66.813 CLASS 3 SEVERE OBESITY DUE TO EXCESS CALORIES WITHOUT SERIOUS COMORBIDITY WITH BODY MASS INDEX (BMI) OF 50.0 TO 59.9 IN ADULT: Status: ACTIVE | Noted: 2023-09-30

## 2024-04-17 LAB
25(OH)D3 SERPL-MCNC: 17 NG/ML (ref 30–100)
ALBUMIN SERPL BCP-MCNC: 3.9 G/DL (ref 3.4–5)
ALP SERPL-CCNC: 64 U/L (ref 33–110)
ALT SERPL W P-5'-P-CCNC: 17 U/L (ref 7–45)
ANION GAP SERPL CALC-SCNC: 10 MMOL/L (ref 10–20)
AST SERPL W P-5'-P-CCNC: 21 U/L (ref 9–39)
BILIRUB SERPL-MCNC: 0.4 MG/DL (ref 0–1.2)
BUN SERPL-MCNC: 13 MG/DL (ref 6–23)
CALCIUM SERPL-MCNC: 8.8 MG/DL (ref 8.6–10.3)
CHLORIDE SERPL-SCNC: 105 MMOL/L (ref 98–107)
CHOLEST SERPL-MCNC: 208 MG/DL (ref 0–199)
CHOLESTEROL/HDL RATIO: 2.3
CO2 SERPL-SCNC: 29 MMOL/L (ref 21–32)
CREAT SERPL-MCNC: 0.68 MG/DL (ref 0.5–1.05)
EGFRCR SERPLBLD CKD-EPI 2021: >90 ML/MIN/1.73M*2
ERYTHROCYTE [DISTWIDTH] IN BLOOD BY AUTOMATED COUNT: 19.8 % (ref 11.5–14.5)
EST. AVERAGE GLUCOSE BLD GHB EST-MCNC: 105 MG/DL
GLUCOSE SERPL-MCNC: 78 MG/DL (ref 74–99)
HBA1C MFR BLD: 5.3 %
HCT VFR BLD AUTO: 35.6 % (ref 36–46)
HDLC SERPL-MCNC: 89.9 MG/DL
HGB BLD-MCNC: 10.3 G/DL (ref 12–16)
IRON SATN MFR SERPL: 5 % (ref 25–45)
IRON SERPL-MCNC: 21 UG/DL (ref 35–150)
LDLC SERPL CALC-MCNC: 109 MG/DL
MCH RBC QN AUTO: 19.7 PG (ref 26–34)
MCHC RBC AUTO-ENTMCNC: 28.9 G/DL (ref 32–36)
MCV RBC AUTO: 68 FL (ref 80–100)
NON HDL CHOLESTEROL: 118 MG/DL (ref 0–149)
NRBC BLD-RTO: 0 /100 WBCS (ref 0–0)
PLATELET # BLD AUTO: 228 X10*3/UL (ref 150–450)
POTASSIUM SERPL-SCNC: 4.3 MMOL/L (ref 3.5–5.3)
PROT SERPL-MCNC: 6.8 G/DL (ref 6.4–8.2)
RBC # BLD AUTO: 5.23 X10*6/UL (ref 4–5.2)
SODIUM SERPL-SCNC: 140 MMOL/L (ref 136–145)
TIBC SERPL-MCNC: 393 UG/DL (ref 240–445)
TRIGL SERPL-MCNC: 44 MG/DL (ref 0–149)
TSH SERPL-ACNC: 2.68 MIU/L (ref 0.44–3.98)
UIBC SERPL-MCNC: 372 UG/DL (ref 110–370)
VLDL: 9 MG/DL (ref 0–40)
WBC # BLD AUTO: 5.3 X10*3/UL (ref 4.4–11.3)

## 2024-04-17 PROCEDURE — 84443 ASSAY THYROID STIM HORMONE: CPT

## 2024-04-17 PROCEDURE — 83550 IRON BINDING TEST: CPT

## 2024-04-17 PROCEDURE — 83540 ASSAY OF IRON: CPT

## 2024-04-17 PROCEDURE — 82306 VITAMIN D 25 HYDROXY: CPT

## 2024-04-17 PROCEDURE — 36415 COLL VENOUS BLD VENIPUNCTURE: CPT

## 2024-04-17 PROCEDURE — 1036F TOBACCO NON-USER: CPT | Performed by: FAMILY MEDICINE

## 2024-04-17 PROCEDURE — 80061 LIPID PANEL: CPT

## 2024-04-17 PROCEDURE — 85027 COMPLETE CBC AUTOMATED: CPT

## 2024-04-17 PROCEDURE — 3008F BODY MASS INDEX DOCD: CPT | Performed by: FAMILY MEDICINE

## 2024-04-17 PROCEDURE — 83036 HEMOGLOBIN GLYCOSYLATED A1C: CPT

## 2024-04-17 PROCEDURE — 80053 COMPREHEN METABOLIC PANEL: CPT

## 2024-04-17 PROCEDURE — 83921 ORGANIC ACID SINGLE QUANT: CPT

## 2024-04-17 PROCEDURE — 99214 OFFICE O/P EST MOD 30 MIN: CPT | Performed by: FAMILY MEDICINE

## 2024-04-17 ASSESSMENT — PATIENT HEALTH QUESTIONNAIRE - PHQ9
2. FEELING DOWN, DEPRESSED OR HOPELESS: NOT AT ALL
SUM OF ALL RESPONSES TO PHQ9 QUESTIONS 1 AND 2: 0
1. LITTLE INTEREST OR PLEASURE IN DOING THINGS: NOT AT ALL

## 2024-04-17 ASSESSMENT — PAIN SCALES - GENERAL: PAINLEVEL: 0-NO PAIN

## 2024-04-17 NOTE — PROGRESS NOTES
Subjective   Patient ID: Robert Patrick is a 34 y.o. female who presents for New Patient Visit.    HPI   Lost 162 lbs with gastric sleeve : 2017  Maintaining weight loss,once it reached in 340s  Drinks premier protein shake once a day   Interterested in taking GLP 1 agonist for weight loss.  Donot exercise, stays on weight all day long at work.  LMP: 3/28,regular  2. Anxiety /depression : resolved with Zoloft.     Review of Systems   All other systems reviewed and are negative.      Objective   /76 (BP Location: Left arm, Patient Position: Sitting, BP Cuff Size: Large adult)   Pulse 74   Temp 36.3 °C (97.3 °F) (Temporal)   Resp 18   Wt (!) 154 kg (340 lb)   LMP 03/28/2024 (Approximate)   SpO2 98%   BMI 58.36 kg/m²     Physical Exam  Vitals and nursing note reviewed.   Cardiovascular:      Rate and Rhythm: Normal rate and regular rhythm.   Pulmonary:      Effort: Pulmonary effort is normal.      Breath sounds: Normal breath sounds.   Musculoskeletal:      Cervical back: Neck supple.   Lymphadenopathy:      Cervical: No cervical adenopathy.   Neurological:      Mental Status: She is alert.   Psychiatric:         Mood and Affect: Mood normal.         Behavior: Behavior normal.         Thought Content: Thought content normal.         Judgment: Judgment normal.         Assessment/Plan   Diagnoses and all orders for this visit:  Physical exam  -     Methylmalonic acid, serum; Future  S/P gastric sleeve procedure  Comments:  2017.  Orders:  -     Iron and TIBC; Future  -     Referral to Clinical Pharmacy; Future  Class 3 severe obesity due to excess calories without serious comorbidity with body mass index (BMI) of 50.0 to 59.9 in adult (Multi)  Comments:  Maintaining weight loss,once it reached in 340s  Interterested in taking GLP 1 agonist for weight loss.  Orders:  -     Referral to Clinical Pharmacy; Future  Anxiety  Comments:  situational, patient to stop zoloft or wean off in next 1 to 2 weeks.  Other  orders  -     Follow Up In Primary Care - Established; Future

## 2024-04-21 LAB — METHYLMALONATE SERPL-SCNC: <0.1 UMOL/L (ref 0–0.4)

## 2024-04-23 RX ORDER — FERROUS SULFATE 325(65) MG
325 TABLET, DELAYED RELEASE (ENTERIC COATED) ORAL
Qty: 30 TABLET | Refills: 11 | Status: SHIPPED | OUTPATIENT
Start: 2024-04-23 | End: 2025-04-23

## 2024-04-23 RX ORDER — ERGOCALCIFEROL 1.25 MG/1
50000 CAPSULE ORAL
Qty: 4 CAPSULE | Refills: 11 | Status: SHIPPED | OUTPATIENT
Start: 2024-04-28 | End: 2025-04-28

## 2024-04-24 ENCOUNTER — TELEPHONE (OUTPATIENT)
Dept: PRIMARY CARE | Facility: CLINIC | Age: 34
End: 2024-04-24
Payer: COMMERCIAL

## 2024-04-24 NOTE — LETTER
April 24, 2024     Robert Patrick    Patient: Robert Patrick   YOB: 1990   Date of Visit: 4/24/2024     Good morning,  All results same as in previous years 1. Low vitamin D and low iron: I am sending prescription for vitamin D and iron to pharmacy. 2. Cholestrol is slightly high : recommend healthy diet ( mediterranean diet)     Sincerely,     CHRISTOPHER FLORES LPN        ______________________________________________________________________________________

## 2024-04-24 NOTE — TELEPHONE ENCOUNTER
----- Message from Stan LUNA MD sent at 4/23/2024  8:30 AM EDT -----  Notify patient all results same as in previous years 1. Low vitamin D and low iron: I am sending prescription for vitamin D and iron to pharmacy. 2. Cholestrol is slightly high : recommend healthy diet ( mediterranean diet)

## 2024-05-08 ENCOUNTER — TELEMEDICINE (OUTPATIENT)
Dept: PHARMACY | Facility: HOSPITAL | Age: 34
End: 2024-05-08
Payer: COMMERCIAL

## 2024-05-08 DIAGNOSIS — Z90.3 S/P GASTRIC SLEEVE PROCEDURE: ICD-10-CM

## 2024-05-08 DIAGNOSIS — E66.01 CLASS 3 SEVERE OBESITY DUE TO EXCESS CALORIES WITHOUT SERIOUS COMORBIDITY WITH BODY MASS INDEX (BMI) OF 50.0 TO 59.9 IN ADULT (MULTI): ICD-10-CM

## 2024-05-08 DIAGNOSIS — E66.01 OBESITY, MORBID (MORE THAN 100 LBS OVER IDEAL WEIGHT OR BMI > 40) (MULTI): Primary | ICD-10-CM

## 2024-05-08 NOTE — PROGRESS NOTES
Pharmacist Clinic: Weight Management    Robert Patrick was referred to the Clinical Pharmacy Team for weight management.     Referring Provider: Stan Macias MD  Problem List Items Addressed This Visit       Obesity, morbid (more than 100 lbs over ideal weight or BMI > 40) (Multi) - Primary    Class 3 severe obesity due to excess calories without serious comorbidity with body mass index (BMI) of 50.0 to 59.9 in adult (Multi)    S/P gastric sleeve procedure       HISTORY OF PRESENT ILLNESS    Weight loss surgery 2017, plateau at same weight for past 3 years (330-340 lbs)    Diet:   Protein shake, cottage cheese, yogurt  Egg roll bowl  Wrap, low carb tortillas  Snacks- cheese stick, pork rinds, beef jerky     Exercise Routine: Walk, 3-4 days/week, active working all day  PCP recommended water aerobics, plans to get gym membership    Weight  Current weight between 330-340 lbs, lost 160 lbs after gastric sleeve in 2017 and is now steady    PHARMACY ASSESSMENT  Pertinent PMH Review:  - PMH of Pancreatitis: No  - PMH of Retinopathy: No  - PMH of MTC: No  - No plans for pregnancy    Allergies: Other, Mucinex [guaifenesin], Codeine, Naproxen sodium, and Penicillins     [facility] #[id number] [address] [address]  Phone: [phone] Fax: [phone]      Affordability/Accessibility: Unknown, patient just started new job so benefits will be changing.      CURRENT PHARMACOTHERAPY  - only takes supplements/vitamins  - sertraline PRN    DRUG INTERACTIONS  - No significant drug-drug interactions exist that require adjustment to therapy    LAB  Lab Results   Component Value Date    BILITOT 0.4 04/17/2024    CALCIUM 8.8 04/17/2024    CO2 29 04/17/2024     04/17/2024    CREATININE 0.68 04/17/2024    GLUCOSE 78 04/17/2024    ALKPHOS 64 04/17/2024    K 4.3 04/17/2024    PROT 6.8 04/17/2024     04/17/2024    AST 21 04/17/2024     ALT 17 04/17/2024    BUN 13 04/17/2024    ANIONGAP 10 04/17/2024    MG 1.86 07/11/2019     04/16/2021    ALBUMIN 3.9 04/17/2024    EGFR >90 04/17/2024     Lab Results   Component Value Date    TRIG 44 04/17/2024    CHOL 208 (H) 04/17/2024    LDLCALC 109 (H) 04/17/2024    HDL 89.9 04/17/2024     Lab Results   Component Value Date    HGBA1C 5.3 04/17/2024       Current Outpatient Medications on File Prior to Visit   Medication Sig Dispense Refill    cyanocobalamin (Vitamin B-12) 100 mcg tablet Take 1 tablet (100 mcg) by mouth once daily.      ergocalciferol (Vitamin D-2) 1.25 MG (00481 UT) capsule Take 1 capsule (50,000 Units) by mouth 1 (one) time per week. 4 capsule 11    ferrous sulfate 325 (65 Fe) MG EC tablet Take 1 tablet by mouth once daily with breakfast. Do not crush, chew, or split. 30 tablet 11    multivitamin tablet Take 1 tablet by mouth once daily.      sertraline (Zoloft) 25 mg tablet TAKE 1 TABLET BY MOUTH DAILY 90 tablet 1     No current facility-administered medications on file prior to visit.       PATIENT EDUCATION/GOALS  - Discussed GLP1 medication options for weight loss, reviewed PMH for eligibility.  Patient is interested in starting a GLP1 medication.  - Discussed insurance benefits to determine medication coverage, patient reports she has recently started a new job and will have new insurance benefits starting 6/1/24.       RECOMMENDATIONS/PLAN  1. Encouraged current diet and plan to increase exercise as able.  2. Follow up once new benefits are in place to evaluate for prescription coverage.      Clinical Pharmacist follow up: 4 weeks  Type of Encounter: follow up    Continue all meds under the continuation of care with the referring provider and clinical pharmacy team.    Thank you,  Radha MoonD     Verbal consent to manage patient's drug therapy was obtained from patient. They were informed they may decline to participate or withdraw from participation in pharmacy services  at any time.

## 2024-10-17 ENCOUNTER — APPOINTMENT (OUTPATIENT)
Dept: PRIMARY CARE | Facility: CLINIC | Age: 34
End: 2024-10-17
Payer: COMMERCIAL

## 2025-03-06 ENCOUNTER — ANCILLARY PROCEDURE (OUTPATIENT)
Dept: URGENT CARE | Age: 35
End: 2025-03-06
Payer: COMMERCIAL

## 2025-03-06 ENCOUNTER — OFFICE VISIT (OUTPATIENT)
Dept: URGENT CARE | Age: 35
End: 2025-03-06
Payer: COMMERCIAL

## 2025-03-06 VITALS
HEART RATE: 80 BPM | DIASTOLIC BLOOD PRESSURE: 83 MMHG | WEIGHT: 293 LBS | BODY MASS INDEX: 50.02 KG/M2 | RESPIRATION RATE: 20 BRPM | SYSTOLIC BLOOD PRESSURE: 133 MMHG | HEIGHT: 64 IN | TEMPERATURE: 98 F | OXYGEN SATURATION: 99 %

## 2025-03-06 DIAGNOSIS — M25.561 ACUTE PAIN OF RIGHT KNEE: Primary | ICD-10-CM

## 2025-03-06 DIAGNOSIS — M25.561 ACUTE PAIN OF RIGHT KNEE: ICD-10-CM

## 2025-03-06 PROCEDURE — 73562 X-RAY EXAM OF KNEE 3: CPT | Mod: RIGHT SIDE | Performed by: NURSE PRACTITIONER

## 2025-03-06 RX ORDER — METHYLPREDNISOLONE 4 MG/1
TABLET ORAL
Qty: 21 TABLET | Refills: 0 | Status: SHIPPED | OUTPATIENT
Start: 2025-03-06 | End: 2025-03-12

## 2025-03-06 ASSESSMENT — ENCOUNTER SYMPTOMS
NEUROLOGICAL NEGATIVE: 1
CONSTITUTIONAL NEGATIVE: 1
MUSCULOSKELETAL NEGATIVE: 1
RESPIRATORY NEGATIVE: 1
PSYCHIATRIC NEGATIVE: 1
HEMATOLOGIC/LYMPHATIC NEGATIVE: 1
EYES NEGATIVE: 1
GASTROINTESTINAL NEGATIVE: 1
PALPITATIONS: 0
ENDOCRINE NEGATIVE: 1
ALLERGIC/IMMUNOLOGIC NEGATIVE: 1

## 2025-03-06 NOTE — LETTER
March 6, 2025     Patient: Robert Patrick   YOB: 1990   Date of Visit: 3/6/2025       To Whom It May Concern:    It is my medical opinion that Robert Patrick may return to work on 3/8/25 .    If you have any questions or concerns, please don't hesitate to call.         Sincerely,        WILFREDO Ivy-CNP    CC: No Recipients/

## 2025-03-06 NOTE — PROGRESS NOTES
Subjective   Patient ID: Robert Patrick is a 34 y.o. female. They present today with a chief complaint of Injury (Right knee pain started 2 weeks ago; 3/5 stepped forward and heard it crack - now difficult to bear weight - ap pain on kneecap radiating laterally).    History of Present Illness    Injury  Associated symptoms: no chest pain        Pt presents to urgent care with c/o  R knee pain x 2 weeks.  States she thinks she slept on it in an awkward position and it was very sore but last night she stepped down and felt a crack.  States knee feels better when it is bent.  Pain is worse with extension and weight bearing.  Denies numbness, tingling  .  Pt denies CP, SOB, palpitations, fevers, abd pain, n/v/d, sick contacts, recent travel.        Past Medical History  Allergies as of 03/06/2025 - Reviewed 03/06/2025   Allergen Reaction Noted    Other Swelling 09/30/2023    Mucinex [guaifenesin] Unknown 11/20/2023    Codeine Swelling 09/30/2023    Naproxen sodium Itching 09/30/2023    Penicillins Rash 09/30/2023       (Not in a hospital admission)       Past Medical History:   Diagnosis Date    Abnormal bleeding in menstrual cycle     Absent menses     Acquired adductovarus rotation of toe of left foot     Acquired adductovarus rotation of toe of right foot     Adult BMI 50.0-59.9 kg/sq m (Multi)     Anemia     Bariatric surgery status     Carpal tunnel syndrome of left wrist     Carpal tunnel syndrome of right wrist     Dysphagia     Helicobacter positive gastritis     History of multiple miscarriages     Hyperlipidemia     Numbness, limb     Obesity, morbid (more than 100 lbs over ideal weight or BMI > 40) (Multi)     Pes planus of both feet     Pre-diabetes     Psychological factors affecting morbid obesity (Multi)     S/P laparoscopic sleeve gastrectomy     Shortness of breath     Shortness of breath     Shortness of breath     Shortness of breath        Past Surgical History:   Procedure Laterality Date    CARPAL  "TUNNEL RELEASE Left 10/20/2023    CARPAL TUNNEL RELEASE Right 11/10/2023    STOMACH SURGERY  2017    Gastric sleeve        reports that she has never smoked. She has never used smokeless tobacco. She reports current alcohol use. She reports that she does not use drugs.    Review of Systems  Review of Systems   Constitutional: Negative.    HENT: Negative.     Eyes: Negative.    Respiratory: Negative.     Cardiovascular:  Negative for chest pain and palpitations.   Gastrointestinal: Negative.    Endocrine: Negative.    Genitourinary: Negative.    Musculoskeletal: Negative.    Skin: Negative.    Allergic/Immunologic: Negative.    Neurological: Negative.    Hematological: Negative.    Psychiatric/Behavioral: Negative.     All other systems reviewed and are negative.                                 Objective    Vitals:    03/06/25 0843   BP: 133/83   BP Location: Left arm   Patient Position: Sitting   Pulse: 80   Resp: 20   Temp: 36.7 °C (98 °F)   SpO2: 99%   Weight: (!) 152 kg (335 lb)   Height: 1.626 m (5' 4\")     Patient's last menstrual period was 02/23/2025 (approximate).    Physical Exam  Vitals and nursing note reviewed.   Constitutional:       General: She is not in acute distress.     Appearance: Normal appearance. She is obese. She is not ill-appearing or toxic-appearing.   HENT:      Head: Atraumatic.      Mouth/Throat:      Mouth: Mucous membranes are moist.      Pharynx: Oropharynx is clear.   Eyes:      Extraocular Movements: Extraocular movements intact.      Conjunctiva/sclera: Conjunctivae normal.      Pupils: Pupils are equal, round, and reactive to light.   Cardiovascular:      Rate and Rhythm: Normal rate.   Pulmonary:      Effort: Pulmonary effort is normal.   Musculoskeletal:      Right knee: No crepitus. Tenderness present over the patellar tendon. Normal alignment. Normal pulse.      Left knee: Normal.   Skin:     General: Skin is warm and dry.   Neurological:      General: No focal deficit " present.      Mental Status: She is alert and oriented to person, place, and time.   Psychiatric:         Mood and Affect: Mood normal.         Behavior: Behavior normal.         Thought Content: Thought content normal.         Procedures    Point of Care Test & Imaging Results from this visit  No results found for this visit on 03/06/25.   XR knee right 3 views    Result Date: 3/6/2025  Interpreted By:  Aviva Lyons, STUDY: XR KNEE RIGHT 3 VIEWS; ;  3/6/2025 9:27 am   INDICATION: Signs/Symptoms:knee pain.   ,M25.561 Pain in right knee   COMPARISON: None.   ACCESSION NUMBER(S): [id number]   ORDERING CLINICIAN: ANGEL ROLDAN   FINDINGS: Three views right knee: There is no fracture, dislocation or joint effusion.   There is mild osteoarthritis. There is mild spurring of the patellofemoral joint. There is mild spurring of the medial and lateral compartments of the femorotibial joint.       Right knee osteoarthritis with no acute bony abnormality.     MACRO: None   Signed by: Aviva Lyons 3/6/2025 9:30 AM Dictation workstation:   [id number]     Diagnostic study results (if any) were reviewed by JUNIOR Ivy.    Assessment/Plan   Allergies, medications, history, and pertinent labs/EKGs/Imaging reviewed by JUNIOR Ivy.     Medical Decision Making   34-year-old female presents with right knee pain.  Intact pulses.  ROM intact.  Xrays of R knee show osteoarthritis with no acute bony abnormality.  Suspect msk pain, possible ligament injury as cause of pt's symptoms.  Recommend RICE.  Will dc home with rx medrol dose pack.  Given referral to ortho for follow up. At time of discharge patient was clinically well-appearing and HDS for outpatient management. The patient was educated regarding diagnosis, supportive care, OTC and Rx medications. The patient was given the opportunity to ask questions prior to discharge.  They verbalized understanding of my discussion of the plans for treatment,  expected course, indications to return to  or seek further evaluation in ED, and the need for timely follow up as directed.   They were provided with a work/school excuse if requested.         Orders and Diagnoses  Diagnoses and all orders for this visit:  Acute pain of right knee  -     XR knee right 3 views; Future  -     methylPREDNISolone (Medrol Dospak) 4 mg tablets; Follow schedule on package instructions  -     Referral to Orthopaedic Surgery; Future  Other orders  -     Follow Up In Primary Care; Future      Medical Admin Record      Patient disposition: Home    Electronically signed by JUNIOR Ivy  10:06 AM

## 2025-03-13 ENCOUNTER — OFFICE VISIT (OUTPATIENT)
Dept: PRIMARY CARE | Facility: CLINIC | Age: 35
End: 2025-03-13
Payer: COMMERCIAL

## 2025-03-13 VITALS
DIASTOLIC BLOOD PRESSURE: 82 MMHG | HEART RATE: 80 BPM | WEIGHT: 293 LBS | SYSTOLIC BLOOD PRESSURE: 132 MMHG | RESPIRATION RATE: 18 BRPM | OXYGEN SATURATION: 98 % | TEMPERATURE: 97.9 F | BODY MASS INDEX: 59.7 KG/M2

## 2025-03-13 DIAGNOSIS — E66.01 CLASS 3 SEVERE OBESITY DUE TO EXCESS CALORIES WITHOUT SERIOUS COMORBIDITY WITH BODY MASS INDEX (BMI) OF 50.0 TO 59.9 IN ADULT: Primary | ICD-10-CM

## 2025-03-13 DIAGNOSIS — E66.813 CLASS 3 SEVERE OBESITY DUE TO EXCESS CALORIES WITHOUT SERIOUS COMORBIDITY WITH BODY MASS INDEX (BMI) OF 50.0 TO 59.9 IN ADULT: Primary | ICD-10-CM

## 2025-03-13 DIAGNOSIS — F41.9 ANXIETY: ICD-10-CM

## 2025-03-13 PROBLEM — Z90.3 HISTORY OF SLEEVE GASTRECTOMY: Status: ACTIVE | Noted: 2025-03-13

## 2025-03-13 PROBLEM — F54 PSYCHOLOGICAL FACTORS AFFECTING MEDICAL CONDITION: Status: ACTIVE | Noted: 2025-03-13

## 2025-03-13 PROBLEM — Z98.84 BARIATRIC SURGERY STATUS: Status: ACTIVE | Noted: 2025-03-13

## 2025-03-13 PROCEDURE — 1036F TOBACCO NON-USER: CPT | Performed by: FAMILY MEDICINE

## 2025-03-13 PROCEDURE — 99213 OFFICE O/P EST LOW 20 MIN: CPT | Performed by: FAMILY MEDICINE

## 2025-03-13 PROCEDURE — 3079F DIAST BP 80-89 MM HG: CPT | Performed by: FAMILY MEDICINE

## 2025-03-13 PROCEDURE — 3075F SYST BP GE 130 - 139MM HG: CPT | Performed by: FAMILY MEDICINE

## 2025-03-13 RX ORDER — SERTRALINE HYDROCHLORIDE 25 MG/1
25 TABLET, FILM COATED ORAL DAILY
Qty: 90 TABLET | Refills: 1 | Status: SHIPPED | OUTPATIENT
Start: 2025-03-13

## 2025-03-13 RX ORDER — IBUPROFEN 600 MG/1
400 TABLET ORAL EVERY 6 HOURS PRN
COMMUNITY
Start: 2024-10-01

## 2025-03-13 ASSESSMENT — PAIN SCALES - GENERAL: PAINLEVEL_OUTOF10: 0-NO PAIN

## 2025-03-13 ASSESSMENT — PATIENT HEALTH QUESTIONNAIRE - PHQ9
SUM OF ALL RESPONSES TO PHQ9 QUESTIONS 1 AND 2: 0
1. LITTLE INTEREST OR PLEASURE IN DOING THINGS: NOT AT ALL
2. FEELING DOWN, DEPRESSED OR HOPELESS: NOT AT ALL

## 2025-03-13 NOTE — PROGRESS NOTES
"Subjective   Patient ID: Robert Patrick is a 34 y.o. female who presents for Med Management.    HPI     Restart Zoloft: without it \" irritable\"      Review of Systems   All other systems reviewed and are negative.      Objective   /82 (BP Location: Left arm, Patient Position: Sitting, BP Cuff Size: Adult)   Pulse 80   Temp 36.6 °C (97.9 °F) (Temporal)   Resp 18   Wt (!) 158 kg (347 lb 12.8 oz)   LMP 02/23/2025 (Approximate)   SpO2 98%   BMI 59.70 kg/m²     Physical Exam  Vitals and nursing note reviewed.   Cardiovascular:      Rate and Rhythm: Normal rate and regular rhythm.   Pulmonary:      Effort: Pulmonary effort is normal.      Breath sounds: Normal breath sounds.   Musculoskeletal:      Cervical back: Neck supple.   Lymphadenopathy:      Cervical: No cervical adenopathy.   Neurological:      Mental Status: She is alert.   Psychiatric:         Mood and Affect: Mood normal.         Behavior: Behavior normal.         Thought Content: Thought content normal.         Judgment: Judgment normal.         Assessment/Plan   Diagnoses and all orders for this visit:  Class 3 severe obesity due to excess calories without serious comorbidity with body mass index (BMI) of 50.0 to 59.9 in adult  -     Referral to Bariatric Surgery; Future  -     CBC and Auto Differential; Future  -     Comprehensive Metabolic Panel; Future  -     Hemoglobin A1c; Future  -     Lipid panel; Future  -     TSH; Future  Anxiety  -     sertraline (Zoloft) 25 mg tablet; Take 1 tablet (25 mg) by mouth once daily.  Other orders  -     Follow Up In Primary Care    Patient plans to be pregnant, not a ideal candidate for GLP/GIP.  Referrd to  for bariatric surgery revision.       "

## 2025-03-17 NOTE — PROGRESS NOTES
"Subjective     Date: 3/17/2025 Time: 3:35 PM  Name: Robert Patrick  MRN: [id number]    This is a 34 y.o. female with morbid obesity (There is no height or weight on file to calculate BMI.) who presents to clinic for consideration of bariatric surgery. she has attempted and failed multiple diet and exercise regimens for weight loss. Initial Onset of obesity was {Initial Obesity Onset:61434:::0}.  Their goal for surgery is to  {Weight Loss Interest:32175}. The patient has tried multiple diets to lose weight including {Previous Diet Trials:99930}. The patient was most successful with the {Most Successful Diet:59274}. The most pounds lost on this diet were *** lbs. The patient considers their dietary weakness to be {Dietary Weakness:09913} The patient reports a  {Weight Pattern:07661::\"highest weight ever of *** pounds\",\"lowest weight ever of *** pounds\"} {Distribution of Obesity:82894}. Current diet: {Diet:45036}. Compliance: {Compliance:25377} Diet Problems: {Diet Problems:55219} } Dietary Details Include:{Dietary Details:64940} The patient {Exercise Frequency:07712} {Excercise Duration (Optional):78527} {Types of Exercise (Optional):00727}    {Comorbidities:89915}  Patient Active Problem List   Diagnosis    Abnormal bleeding in menstrual cycle    Acquired curly toe    Acquired plantar keratoderma    Alteration in nutrition    Carpal tunnel syndrome, left upper limb    Carpal tunnel syndrome, right upper limb    Flat foot    Helicobacter positive gastritis    Hyperlipidemia    PJ (iron deficiency anemia)    Iron deficiency    Obesity, morbid (more than 100 lbs over ideal weight or BMI > 40) (Multi)    Pre-diabetes    Class 3 severe obesity due to excess calories without serious comorbidity with body mass index (BMI) of 50.0 to 59.9 in adult    Sleep apnea    Thiamin deficiency    Vitamin D deficiency    Corns and callosities    Adductovarus rotation of toe, acquired, right    S/P gastric sleeve procedure    Bariatric " surgery status    Essential hypertension    Psychological factors affecting medical condition    History of sleeve gastrectomy       {Procedure Preferred:29488}    {GERDQOL:63709}    PMH:   Past Medical History:   Diagnosis Date    Abnormal bleeding in menstrual cycle     Absent menses     Acquired adductovarus rotation of toe of left foot     Acquired adductovarus rotation of toe of right foot     Adult BMI 50.0-59.9 kg/sq m (Multi)     Anemia     Bariatric surgery status     Carpal tunnel syndrome of left wrist     Carpal tunnel syndrome of right wrist     Dysphagia     Helicobacter positive gastritis     History of multiple miscarriages     Hyperlipidemia     Numbness, limb     Obesity, morbid (more than 100 lbs over ideal weight or BMI > 40) (Multi)     Pes planus of both feet     Pre-diabetes     Psychological factors affecting morbid obesity (Multi)     S/P laparoscopic sleeve gastrectomy     Shortness of breath     Shortness of breath     Shortness of breath     Shortness of breath         PSH:   Past Surgical History:   Procedure Laterality Date    CARPAL TUNNEL RELEASE Left 10/20/2023    CARPAL TUNNEL RELEASE Right 11/10/2023    STOMACH SURGERY  2017    Gastric sleeve        STOPBANG *** (+ ***).   *** personal/family hx of VTE.    FAMILY HISTORY:  Family History   Problem Relation Name Age of Onset    Heart disease Mother      Heart disease Father      No Known Problems Sister      No Known Problems Brother      No Known Problems Daughter      No Known Problems Son      No Known Problems Mother's Sister      No Known Problems Mother's Brother      No Known Problems Father's Sister      No Known Problems Father's Brother      No Known Problems Maternal Grandmother      No Known Problems Maternal Grandfather      No Known Problems Paternal Grandmother      No Known Problems Paternal Grandfather      No Known Problems Other          SOCIAL HISTORY:  Social History     Tobacco Use    Smoking status: Never      Passive exposure: Never    Smokeless tobacco: Never    Tobacco comments:     Past Vaper quit September 2024   Vaping Use    Vaping status: Never Used   Substance Use Topics    Alcohol use: Yes     Comment: socially    Drug use: Never       MEDICATIONS:  Prior to Admission Medications:  Medication Documentation Review Audit       Reviewed by Stan LUNA MD (Physician) on 03/13/25 at 1409      Medication Order Taking? Sig Documenting Provider Last Dose Status   cyanocobalamin (Vitamin B-12) 100 mcg tablet [id number] Yes Take 1 tablet (100 mcg) by mouth once daily. Historical Provider, MD  Active   ergocalciferol (Vitamin D-2) 1.25 MG (58401 UT) capsule [id number] Yes Take 1 capsule (50,000 Units) by mouth 1 (one) time per week. Stan LUNA MD  Active   ferrous sulfate 325 (65 Fe) MG EC tablet [id number]  Take 1 tablet by mouth once daily with breakfast. Do not crush, chew, or split.   Patient not taking: Reported on 3/13/2025    Stan LUNA MD  Active   ibuprofen 600 mg tablet [id number] Yes Take 400 mg by mouth every 6 hours if needed for moderate pain (4 - 6). Historical Provider, MD  Active     Discontinued 03/13/25 1359   multivitamin tablet [id number] Yes Take 1 tablet by mouth once daily. Historical Provider, MD  Active   sertraline (Zoloft) 25 mg tablet [id number] Yes TAKE 1 TABLET BY MOUTH DAILY Lauryn Mcdaniel, APRN-CNP  Active                     ALLERGIES:  Allergies   Allergen Reactions    Other Swelling     Seafood    Mucinex [Guaifenesin] Unknown    Codeine Swelling    Naproxen Sodium Itching    Penicillins Rash       REVIEW OF SYSTEMS:  GENERAL: Negative for malaise, significant weight loss and fever  HEAD: Negative for headache, swelling.  NECK: Negative for lumps, goiter, pain and significant neck swelling  RESPIRATORY: Negative for cough, wheezing or shortness of breath.  CARDIOVASCULAR: Negative for chest pain, leg swelling or palpitations.  GI: Negative for abdominal  discomfort, blood in stools or black stools or change in bowel habits  : No history of dysuria, frequency or incontinence  MUSCULOSKELETAL: Negative for joint pain or swelling, back pain or muscle pain.  SKIN: Negative for lesions, rash, and itching.  PSYCH: Negative for sleep disturbance, mood disorder and recent psychosocial stressors.  ENDOCRINE: Negative for cold or heat intolerance, polyuria, polydipsia and goiter.    Objective   PHYSICAL EXAM:  Visit Vitals  LMP 02/23/2025 (Approximate)   OB Status Having periods   Smoking Status Never     General appearance: obese, NAD  Neuro: AOx3  Head: EOMI; no swelling or lesions of scalp or face  ENT:  no lumps or lymphadenopathy, thyroid normal to palpation; oropharynx clear, no swelling or erythema  Skin: warm, no erythema or rashes  Lungs: clear to percussion and auscultation  Heart: regular rhythm and S1, S2 normal  Abdomen: soft, non-tender, no masses, no organomegaly  Extremities: Normal exam of the extremities. No swelling or pain.  Psych: no hurried speech, no flight of ideas, normal affect    Assessment/Plan   IMPRESSION:  Robert Patrick is a 34 y.o. female with a BMI of There is no height or weight on file to calculate BMI. 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 foot     Acquired adductovarus rotation of toe of right foot     Adult BMI 50.0-59.9 kg/sq m (Multi)     Anemia     Bariatric surgery status     Carpal tunnel syndrome of left wrist     Carpal tunnel syndrome of right wrist     Dysphagia     Helicobacter positive gastritis     History of multiple miscarriages     Hyperlipidemia     Numbness, limb     Obesity, morbid (more than 100 lbs over ideal weight or BMI > 40) (Multi)     Pes planus of both feet     Pre-diabetes     Psychological factors affecting morbid obesity (Multi)     S/P  laparoscopic sleeve gastrectomy     Shortness of breath     Shortness of breath     Shortness of breath     Shortness of breath        This patient does meet the criteria for a surgical weight loss procedure according to NIH guidelines.  The risks of sleeve gastrectomy, Ruben-en-Y gastric bypass, and duodenal switch surgery including but not limited to bleeding, leak along staple lines, infection, dehydration, ulcers, internal hernia, DVT/PE, pneumonia, myocardial infarction, prolonged nausea/vomiting, incomplete resolution of associated medical conditions, reflux, weight regain, vitamin/mineral deficiencies, and death have been explained to the patient and Robert Patrick has expressed understanding and acceptance of them.     We discussed the lifestyle changes necessary to be successful following surgery.    The increased risk of substance and alcohol abuse following bariatric surgery was discussed with the patient, along with the negative consequences of substance/alcohol use after surgery including addiction, worsening of mental health disorders, and injury to the stomach. The risk of smoking and vaping (tobacco or any other substance) after bariatric surgery was explained to the patient. This includes risk of anastamotic ulcers, gastritis, bleeding, perforation, stricture, and PO intolerance.  The patient expressed understanding and acceptance of these risks.    ***The patient was advised not to become pregnant within 12-18 months following bariatric surgery. She was educated on the increased risks to mother and fetus associated with pregnancy within 2 years of bariatric surgery.    The benefits of the above surgeries including weight loss, improvement/resolution of associated medical and mental health conditions, improved mobility, and decreased mortality have been explained the the patient and Robert Patrick has expressed understanding and acceptance of them.      PLAN:  The plan of treatment for Robert SIFUENTES  Celina is to continue with the consultations and tests ordered today in hopes of qualifying for pre-operative clearance for bariatric surgery. This includes:    Consult Nutrition for education and *** months of MSWL  Consult Psychology  Consult Cardiology  Consult Pulmonology  Labs ordered  EGD  PCP for medical optimization  Consult sleep medicine - concern for LYNDON  Recommend at least *** lbs of weight loss prior to surgery.  ***        *** minutes were spent with patient including history, physical exam, and education.

## 2025-03-20 ENCOUNTER — TELEPHONE (OUTPATIENT)
Dept: PRIMARY CARE | Facility: CLINIC | Age: 35
End: 2025-03-20
Payer: COMMERCIAL

## 2025-03-20 ENCOUNTER — TELEPHONE (OUTPATIENT)
Dept: SURGERY | Facility: HOSPITAL | Age: 35
End: 2025-03-20
Payer: COMMERCIAL

## 2025-03-20 DIAGNOSIS — E66.01 CLASS 3 SEVERE OBESITY DUE TO EXCESS CALORIES WITHOUT SERIOUS COMORBIDITY WITH BODY MASS INDEX (BMI) OF 50.0 TO 59.9 IN ADULT: Primary | ICD-10-CM

## 2025-03-20 DIAGNOSIS — E66.813 CLASS 3 SEVERE OBESITY DUE TO EXCESS CALORIES WITHOUT SERIOUS COMORBIDITY WITH BODY MASS INDEX (BMI) OF 50.0 TO 59.9 IN ADULT: Primary | ICD-10-CM

## 2025-03-20 NOTE — LETTER
March 20, 2025     Good afternoon Robert Patrick It is important to complete Endocrinology consultant ( referral placed) to assess the cause of weight ( BMI is close to 60).      Sincerely,     Stan Macias MD

## 2025-03-25 ENCOUNTER — APPOINTMENT (OUTPATIENT)
Dept: SURGERY | Facility: HOSPITAL | Age: 35
End: 2025-03-25
Payer: COMMERCIAL

## 2025-03-26 ENCOUNTER — OFFICE VISIT (OUTPATIENT)
Dept: ORTHOPEDIC SURGERY | Facility: CLINIC | Age: 35
End: 2025-03-26
Payer: COMMERCIAL

## 2025-03-26 VITALS — HEIGHT: 64 IN | BODY MASS INDEX: 50.02 KG/M2 | WEIGHT: 293 LBS

## 2025-03-26 DIAGNOSIS — M17.11 ARTHRITIS OF RIGHT KNEE: Primary | ICD-10-CM

## 2025-03-26 PROCEDURE — 1036F TOBACCO NON-USER: CPT | Performed by: ORTHOPAEDIC SURGERY

## 2025-03-26 PROCEDURE — 99213 OFFICE O/P EST LOW 20 MIN: CPT | Performed by: ORTHOPAEDIC SURGERY

## 2025-03-26 PROCEDURE — 3008F BODY MASS INDEX DOCD: CPT | Performed by: ORTHOPAEDIC SURGERY

## 2025-03-26 NOTE — PROGRESS NOTES
Subjective    Patient ID: Robert Patrick is a 34 y.o. female.    Chief Complaint: Pain of the Right Knee     Last Surgery: Right Carpal Tunnel Release - Right  Last Surgery Date: 11/10/2023    HPI  The patient comes in the clinic today with a complaint of right knee pain.  This is been present for at least the past 2 months.  She is in the process of undergoing workup for her obesity and a second weight loss surgery.  She will notice pain with prolonged sitting and in standing.  Most of her pain is on the lateral aspect of her right knee.  She does not recall any trauma.  She denies fevers or chills.    Objective   Ortho Exam  The patient is in no acute distress.  Exam of her right leg reveals the patient is morbidly obese.  She is tender along what would likely be the lateral joint line.  Palpation of landmarks was difficult.  She had no tenderness along the medial aspect of the knee.  The knee was grossly stable to varus and valgus stress testing.  Range of motion was from 0 to about 112 degrees.  Image Results:  XR knee right 3 views  Narrative: Interpreted By:  Aviva Lyons,   STUDY:  XR KNEE RIGHT 3 VIEWS; ;  3/6/2025 9:27 am      INDICATION:  Signs/Symptoms:knee pain.      ,M25.561 Pain in right knee      COMPARISON:  None.      ACCESSION NUMBER(S):  [id number]      ORDERING CLINICIAN:  ANGEL ROLDAN      FINDINGS:  Three views right knee:  There is no fracture, dislocation or joint effusion.      There is mild osteoarthritis.  There is mild spurring of the patellofemoral joint.  There is mild spurring of the medial and lateral compartments of the  femorotibial joint.      Impression: Right knee osteoarthritis with no acute bony abnormality.          MACRO:  None      Signed by: Aviva Lyons 3/6/2025 9:30 AM  Dictation workstation:   [id number]      Assessment/Plan   Encounter Diagnoses:  The patient has right knee pain secondary to arthritis.  I explained to the patient she may benefit from a  Kenalog injection to help with symptomatic relief.  However explained the patient this would be very difficult to do just by typical palpation.  The patient understands this and will follow-up with Dr. Edgra to undergo an injection under ultrasound.  She will also be following up with her bariatric surgeon and endocrinologist to work on her weight loss.

## 2025-03-27 ENCOUNTER — OFFICE VISIT (OUTPATIENT)
Dept: ORTHOPEDIC SURGERY | Facility: CLINIC | Age: 35
End: 2025-03-27
Payer: COMMERCIAL

## 2025-03-27 ENCOUNTER — HOSPITAL ENCOUNTER (OUTPATIENT)
Dept: RADIOLOGY | Facility: EXTERNAL LOCATION | Age: 35
Discharge: HOME | End: 2025-03-27

## 2025-03-27 DIAGNOSIS — M25.561 RIGHT KNEE PAIN, UNSPECIFIED CHRONICITY: ICD-10-CM

## 2025-03-27 DIAGNOSIS — M17.11 ARTHRITIS OF RIGHT KNEE: Primary | ICD-10-CM

## 2025-03-27 PROCEDURE — 1036F TOBACCO NON-USER: CPT | Performed by: FAMILY MEDICINE

## 2025-03-27 PROCEDURE — 20611 DRAIN/INJ JOINT/BURSA W/US: CPT | Performed by: FAMILY MEDICINE

## 2025-03-27 PROCEDURE — 99213 OFFICE O/P EST LOW 20 MIN: CPT | Performed by: FAMILY MEDICINE

## 2025-03-27 RX ORDER — LIDOCAINE HYDROCHLORIDE 20 MG/ML
2 INJECTION, SOLUTION INFILTRATION; PERINEURAL
Status: COMPLETED | OUTPATIENT
Start: 2025-03-27 | End: 2025-03-27

## 2025-03-27 RX ORDER — TRIAMCINOLONE ACETONIDE 40 MG/ML
40 INJECTION, SUSPENSION INTRA-ARTICULAR; INTRAMUSCULAR
Status: COMPLETED | OUTPATIENT
Start: 2025-03-27 | End: 2025-03-27

## 2025-03-27 RX ADMIN — TRIAMCINOLONE ACETONIDE 40 MG: 40 INJECTION, SUSPENSION INTRA-ARTICULAR; INTRAMUSCULAR at 14:17

## 2025-03-27 RX ADMIN — LIDOCAINE HYDROCHLORIDE 2 ML: 20 INJECTION, SOLUTION INFILTRATION; PERINEURAL at 14:17

## 2025-03-27 NOTE — PROGRESS NOTES
History of Present Illness   Chief Complaint   Patient presents with    Right Knee - Follow-up     OPNP R KNEE PAIN PER ALP  R KNEE INJURY X 6  WEEKS AGO  PT STATES THEY WERE JUST WALKING AND FELT A POP WHEN THEY STEPPED WITH THEIR RLE       The patient is 34 y.o. female  here with a complaint of right knee pain, referred by Dr. Carlson for consideration of right knee joint injection.  Onset of symptoms 6 to 8 weeks ago, walking and felt a popping sensation in the knee, pain most prominent over the lateral aspect of the knee, pain exacerbated by walking, standing.  Did see Dr. Carlson recently, he was concerned about ability to do palpation guided injection secondary to excess adipose tissue at the knee.  Patient is currently working on bariatric surgery workup, current BMI is 59.5.  No history of any significant knee problems in the past.    Past Medical History:   Diagnosis Date    Abnormal bleeding in menstrual cycle     Absent menses     Acquired adductovarus rotation of toe of left foot     Acquired adductovarus rotation of toe of right foot     Adult BMI 50.0-59.9 kg/sq m (Multi)     Anemia     Bariatric surgery status     Carpal tunnel syndrome of left wrist     Carpal tunnel syndrome of right wrist     Dysphagia     Helicobacter positive gastritis     History of multiple miscarriages     Hyperlipidemia     Numbness, limb     Obesity, morbid (more than 100 lbs over ideal weight or BMI > 40) (Multi)     Pes planus of both feet     Pre-diabetes     Psychological factors affecting morbid obesity (Multi)     S/P laparoscopic sleeve gastrectomy     Shortness of breath     Shortness of breath     Shortness of breath     Shortness of breath        Medication Documentation Review Audit       Reviewed by Dez Carlson MD (Physician) on 03/26/25 at 1427      Medication Order Taking? Sig Documenting Provider Last Dose Status   cyanocobalamin (Vitamin B-12) 100 mcg tablet [id number] No Take 1 tablet (100 mcg) by mouth  once daily. Historical Provider, MD Taking Active   ergocalciferol (Vitamin D-2) 1.25 MG (20117 UT) capsule [id number]  Take 1 capsule (50,000 Units) by mouth 1 (one) time per week. Stan LUNA MD  Active   ferrous sulfate 325 (65 Fe) MG EC tablet [id number]  Take 1 tablet by mouth once daily with breakfast. Do not crush, chew, or split.   Patient not taking: Reported on 3/13/2025    Stan LUNA MD  Active   ibuprofen 600 mg tablet [id number]  Take 400 mg by mouth every 6 hours if needed for moderate pain (4 - 6). Historical Provider, MD  Active   multivitamin tablet [id number] No Take 1 tablet by mouth once daily. Historical Provider, MD Taking Active   sertraline (Zoloft) 25 mg tablet [id number]  Take 1 tablet (25 mg) by mouth once daily. Stan LUNA MD  Active                    Allergies   Allergen Reactions    Other Swelling     Seafood    Mucinex [Guaifenesin] Unknown    Codeine Swelling    Naproxen Sodium Itching    Penicillins Rash       Social History     Socioeconomic History    Marital status: Single     Spouse name: Not on file    Number of children: Not on file    Years of education: Not on file    Highest education level: Not on file   Occupational History    Not on file   Tobacco Use    Smoking status: Never     Passive exposure: Never    Smokeless tobacco: Never    Tobacco comments:     Past Vaper quit September 2024   Vaping Use    Vaping status: Never Used   Substance and Sexual Activity    Alcohol use: Yes     Comment: socially    Drug use: Never    Sexual activity: Not on file   Other Topics Concern    Not on file   Social History Narrative    Not on file     Social Drivers of Health     Financial Resource Strain: Not on file   Food Insecurity: Not on file   Transportation Needs: Not on file   Physical Activity: Not on file   Stress: Not on file   Social Connections: Not on file   Intimate Partner Violence: Not on file   Housing Stability: Not on file       Past Surgical History:    Procedure Laterality Date    CARPAL TUNNEL RELEASE Left 10/20/2023    CARPAL TUNNEL RELEASE Right 11/10/2023    STOMACH SURGERY  2017    Gastric sleeve          Review of Systems   GENERAL: Negative  GI: Negative  MUSCULOSKELETAL: See HPI  SKIN: Negative  NEURO:  Negative     Physical Exam:    General/Constitutional: well appearing, no distress, appears stated age  HEENT: sclera clear  Respiratory: non labored breathing  Vascular: No edema, swelling or tenderness, except as noted in detailed exam.  Integumentary: No impressive skin lesions present, except as noted in detailed exam.  Neurological:  Alert and oriented   Psychological:  Normal mood and affect.  Musculoskeletal: Normal, except as noted in detailed exam and in HPI      Right knee: There is notable excess adipose tissue, no definitive soft tissue swelling or joint effusion is present.  There is some lateral joint line tenderness.  Range of motion is limited by adipose tissue and pain.  No gross motor deficits or ligamentous laxity.     Imaging: X-rays of right knee were reviewed, there is some mild degenerative changes with some mild osteophytosis tricompartmentally.    L Inj/Asp: R knee on 3/27/2025 2:17 PM  Indications: pain  Details: 22 G needle, ultrasound-guided superolateral approach  Medications: 40 mg triamcinolone acetonide 40 mg/mL; 2 mL lidocaine 20 mg/mL (2 %)  Outcome: tolerated well, no immediate complications    Right knee joint and surrounding structures were appropriately visualized before and during injection    Ultrasound images were permanently uploaded to the medical record/PACS  Procedure, treatment alternatives, risks and benefits explained, specific risks discussed. Consent was given by the patient. Immediately prior to procedure a time out was called to verify the correct patient, procedure, equipment, support staff and site/side marked as required. Patient was prepped and draped in the usual sterile fashion.                Assessment   1. Arthritis of right knee  Point of Care Ultrasound      2. Right knee pain, unspecified chronicity              Plan discussed further workup and treatment patient.  Decision was made to proceed with ultrasound-guided right knee joint injection with Kenalog, was able to visualize spinal needle within the knee joint today under ultrasound guidance, she tolerated issue, she will monitor for improvement in symptoms over the next 1 to 2 weeks.  She will continue to work on weight loss.  She will plan to follow-up as symptoms dictate.

## 2025-05-05 ENCOUNTER — APPOINTMENT (OUTPATIENT)
Dept: OBSTETRICS AND GYNECOLOGY | Facility: CLINIC | Age: 35
End: 2025-05-05
Payer: COMMERCIAL

## 2025-05-08 ENCOUNTER — APPOINTMENT (OUTPATIENT)
Dept: OBSTETRICS AND GYNECOLOGY | Facility: CLINIC | Age: 35
End: 2025-05-08
Payer: COMMERCIAL

## 2025-05-08 VITALS
WEIGHT: 293 LBS | BODY MASS INDEX: 50.02 KG/M2 | DIASTOLIC BLOOD PRESSURE: 86 MMHG | HEIGHT: 64 IN | SYSTOLIC BLOOD PRESSURE: 138 MMHG

## 2025-05-08 DIAGNOSIS — N89.8 VAGINAL DISCHARGE: ICD-10-CM

## 2025-05-08 DIAGNOSIS — Z01.419 ENCOUNTER FOR ANNUAL ROUTINE GYNECOLOGICAL EXAMINATION: Primary | ICD-10-CM

## 2025-05-08 DIAGNOSIS — Z01.419 PAP SMEAR, AS PART OF ROUTINE GYNECOLOGICAL EXAMINATION: ICD-10-CM

## 2025-05-08 DIAGNOSIS — N92.6 ABNORMAL MENSTRUAL CYCLE: ICD-10-CM

## 2025-05-08 LAB — PREGNANCY TEST URINE, POC: NEGATIVE

## 2025-05-08 PROCEDURE — 81025 URINE PREGNANCY TEST: CPT | Performed by: OBSTETRICS & GYNECOLOGY

## 2025-05-08 PROCEDURE — 3075F SYST BP GE 130 - 139MM HG: CPT | Performed by: OBSTETRICS & GYNECOLOGY

## 2025-05-08 PROCEDURE — 1036F TOBACCO NON-USER: CPT | Performed by: OBSTETRICS & GYNECOLOGY

## 2025-05-08 PROCEDURE — 87626 HPV SEP HI-RSK TYP&POOL RSLT: CPT

## 2025-05-08 PROCEDURE — 3008F BODY MASS INDEX DOCD: CPT | Performed by: OBSTETRICS & GYNECOLOGY

## 2025-05-08 PROCEDURE — 3079F DIAST BP 80-89 MM HG: CPT | Performed by: OBSTETRICS & GYNECOLOGY

## 2025-05-08 PROCEDURE — 99395 PREV VISIT EST AGE 18-39: CPT | Performed by: OBSTETRICS & GYNECOLOGY

## 2025-05-08 NOTE — PROGRESS NOTES
Subjective   Patient ID: Robert Patrick is a 35 y.o. female who presents for Annual Exam (Patient here for annual--has c/o spotting very light last cycle--25-not normal for her and nausea--uhcg in office today is negative/Contraception-none/Declined chaperone).  HPI  Patient is a 35-year-old  2 para 0-0-2-0 whose last menstrual period was , she said normally 1 month apart she will bleed for about 2 to 3 days.  Then had another bleeding episode began  light pink and lasted about a week.  Negative pregnancy test in office today.  Also complaining of some vaginal discharge denies other vaginal symptoms.  She admits to regular bowel movements denies any urinary symptoms.  Of note patient was on Zoloft from January to March currently not taking.  Last normal Pap and HPV 2020.  She and her partner are trying to get pregnant.  Review of Systems    Objective   Physical Exam  Gen.: Alert and in no acute distress. Well-developed, well-nourished.  Thyroid: Nonenlarged and no palpable thyroid nodules  Cardiovascular: Heart regular rate and rhythm  Pulmonary: Clear bilateral breath sounds  Breasts: Normal appearance, no nipple discharge and no skin changes. No palpable masses and no axillary lymphadenopathy  Abdomen: Obese, soft and nontender  Pelvic: External genitalia normal, Bartholin's urethral and Gardners's glands normal. Vagina normal. Cervix normal.  Uterus and adnexa difficult to evaluate secondary to patient's BMI however no obvious masses or tenderness.  Perianal area and normal.  Assessment/Plan   Diagnoses and all orders for this visit:  Encounter for annual routine gynecological examination  Abnormal menstrual cycle  -     POCT pregnancy, urine manually resulted  Negative pregnancy test in office today  Pap smear, as part of routine gynecological examination  -     THINPREP PAP TEST (>30)  Vaginal discharge  -     C. trachomatis / N. gonorrhoeae, Amplified, Urogenital  -      Trichomonas vaginalis, Amplified  -     Vaginitis Gram Stain For Bacterial Vaginosis + Yeast     Discussed abnormal menstrual cycle with patient at length, possibly due to recent Zoloft use.  If continues to be irregular over the next several weeks recommend repeating pregnancy test.  Otherwise follow expectantly.    Tenzin Hsieh MD 05/08/25 12:06 PM

## 2025-05-09 ENCOUNTER — TELEPHONE (OUTPATIENT)
Dept: OBSTETRICS AND GYNECOLOGY | Facility: CLINIC | Age: 35
End: 2025-05-09
Payer: COMMERCIAL

## 2025-05-09 LAB
BV SCORE VAG QL: ABNORMAL
C TRACH RRNA SPEC QL NAA+PROBE: NOT DETECTED
N GONORRHOEA RRNA SPEC QL NAA+PROBE: NOT DETECTED
QUEST GC CT AMPLIFIED (ALWAYS MESSAGE): NORMAL
T VAGINALIS RRNA SPEC QL NAA+PROBE: NOT DETECTED

## 2025-05-10 DIAGNOSIS — N76.0 BV (BACTERIAL VAGINOSIS): Primary | ICD-10-CM

## 2025-05-10 DIAGNOSIS — B96.89 BV (BACTERIAL VAGINOSIS): Primary | ICD-10-CM

## 2025-05-10 RX ORDER — METRONIDAZOLE 500 MG/1
500 TABLET ORAL 2 TIMES DAILY
Qty: 14 TABLET | Refills: 0 | Status: SHIPPED | OUTPATIENT
Start: 2025-05-10 | End: 2025-05-17

## 2025-05-19 LAB
CYTOLOGY CMNT CVX/VAG CYTO-IMP: NORMAL
HPV HR 12 DNA GENITAL QL NAA+PROBE: POSITIVE
HPV HR GENOTYPES PNL CVX NAA+PROBE: POSITIVE
HPV16 DNA SPEC QL NAA+PROBE: NEGATIVE
HPV18 DNA SPEC QL NAA+PROBE: POSITIVE
LAB AP HPV GENOTYPE QUESTION: YES
LAB AP HPV HR: NORMAL
LABORATORY COMMENT REPORT: NORMAL
LMP START DATE: NORMAL
PATH REPORT.TOTAL CANCER: NORMAL

## 2025-06-13 ENCOUNTER — APPOINTMENT (OUTPATIENT)
Dept: OBSTETRICS AND GYNECOLOGY | Facility: CLINIC | Age: 35
End: 2025-06-13
Payer: COMMERCIAL

## 2025-06-13 VITALS
HEIGHT: 64 IN | WEIGHT: 293 LBS | DIASTOLIC BLOOD PRESSURE: 87 MMHG | BODY MASS INDEX: 50.02 KG/M2 | SYSTOLIC BLOOD PRESSURE: 142 MMHG

## 2025-06-13 DIAGNOSIS — R87.612 LOW GRADE SQUAMOUS INTRAEPITHELIAL LESION (LGSIL) ON CERVICAL PAP SMEAR: ICD-10-CM

## 2025-06-13 LAB — PREGNANCY TEST URINE, POC: NEGATIVE

## 2025-06-13 PROCEDURE — 57454 BX/CURETT OF CERVIX W/SCOPE: CPT | Performed by: OBSTETRICS & GYNECOLOGY

## 2025-06-13 PROCEDURE — 81025 URINE PREGNANCY TEST: CPT | Performed by: OBSTETRICS & GYNECOLOGY

## 2025-06-13 NOTE — PROGRESS NOTES
Patient ID: Robert Patrick is a 35 y.o. female.    Colposcopy    Date/Time: 6/13/2025 2:50 PM    Performed by: Tenzin Hsieh MD  Authorized by: Tenzin Hsieh MD    Procedure location: cervix    Consent:     Patient questions answered: yes      Risks and benefits of the procedure and its alternatives discussed: yes      Consent obtained:  Written    Consent given by:  Patient  Indication:     Cervical indication(s): high-risk HPV positive and cervical LSIL    Pre-procedure:     Prep solution(s): acetic acid    Procedure:     Colposcopy with: endocervical curettage      Colposcopy details:  Speculum placed into vagina.  Cervix prepared with acetic acid.  Colposcopic visualization of the cervix revealed no acetowhite areas or abnormal vascular pattern.  ECC was performed.  Cervix was observed for several minutes with no active bleeding.    Cervix visibility: fully visualized      SCJ visibility: fully visualized      Cervical impression: normal/benign    Post-procedure:     Patient tolerance of procedure:  Patient tolerated the procedure well with no immediate complications

## 2025-06-24 LAB
LABORATORY COMMENT REPORT: NORMAL
PATH REPORT.FINAL DX SPEC: NORMAL
PATH REPORT.GROSS SPEC: NORMAL
PATH REPORT.RELEVANT HX SPEC: NORMAL
PATH REPORT.TOTAL CANCER: NORMAL

## 2025-08-11 ENCOUNTER — APPOINTMENT (OUTPATIENT)
Dept: ORTHOPEDIC SURGERY | Facility: CLINIC | Age: 35
End: 2025-08-11
Payer: COMMERCIAL

## 2025-08-11 ENCOUNTER — HOSPITAL ENCOUNTER (OUTPATIENT)
Dept: RADIOLOGY | Facility: EXTERNAL LOCATION | Age: 35
Discharge: HOME | End: 2025-08-11

## 2025-08-11 ENCOUNTER — HOSPITAL ENCOUNTER (OUTPATIENT)
Dept: RADIOLOGY | Facility: CLINIC | Age: 35
Discharge: HOME | End: 2025-08-11
Payer: COMMERCIAL

## 2025-08-11 DIAGNOSIS — M25.562 ACUTE PAIN OF LEFT KNEE: ICD-10-CM

## 2025-08-11 DIAGNOSIS — M17.12 ARTHRITIS OF LEFT KNEE: ICD-10-CM

## 2025-08-11 DIAGNOSIS — M25.561 ACUTE PAIN OF RIGHT KNEE: ICD-10-CM

## 2025-08-11 DIAGNOSIS — M17.11 ARTHRITIS OF RIGHT KNEE: Primary | ICD-10-CM

## 2025-08-11 PROCEDURE — 1036F TOBACCO NON-USER: CPT | Performed by: FAMILY MEDICINE

## 2025-08-11 PROCEDURE — 20611 DRAIN/INJ JOINT/BURSA W/US: CPT | Performed by: FAMILY MEDICINE

## 2025-08-11 PROCEDURE — 99214 OFFICE O/P EST MOD 30 MIN: CPT | Performed by: FAMILY MEDICINE

## 2025-08-11 PROCEDURE — 73562 X-RAY EXAM OF KNEE 3: CPT | Mod: LEFT SIDE | Performed by: RADIOLOGY

## 2025-08-11 PROCEDURE — 73562 X-RAY EXAM OF KNEE 3: CPT | Mod: LT

## 2025-08-11 RX ORDER — LIDOCAINE HYDROCHLORIDE 20 MG/ML
2 INJECTION, SOLUTION INFILTRATION; PERINEURAL
Status: COMPLETED | OUTPATIENT
Start: 2025-08-11 | End: 2025-08-11

## 2025-08-11 RX ORDER — TRIAMCINOLONE ACETONIDE 40 MG/ML
40 INJECTION, SUSPENSION INTRA-ARTICULAR; INTRAMUSCULAR
Status: COMPLETED | OUTPATIENT
Start: 2025-08-11 | End: 2025-08-11

## 2025-08-11 RX ADMIN — TRIAMCINOLONE ACETONIDE 40 MG: 40 INJECTION, SUSPENSION INTRA-ARTICULAR; INTRAMUSCULAR at 16:33

## 2025-08-11 RX ADMIN — LIDOCAINE HYDROCHLORIDE 2 ML: 20 INJECTION, SOLUTION INFILTRATION; PERINEURAL at 16:33

## 2026-03-16 ENCOUNTER — APPOINTMENT (OUTPATIENT)
Dept: PRIMARY CARE | Facility: CLINIC | Age: 36
End: 2026-03-16
Payer: COMMERCIAL

## (undated) DEVICE — BANDAGE, ELASTIC, PREMIUM, SELF-CLOSE, 4 IN X 5.5 YD, STERILE

## (undated) DEVICE — PADDING, UNDERCAST, WEBRIL, 3 IN X 4 YD, REG, NS

## (undated) DEVICE — FORCEP, BIOPOLAR, ADSON, NON INSUL, 5IN 1.0MM

## (undated) DEVICE — DRESSING, GAUZE, PETROLATUM, PATCH, XEROFORM, 1 X 8 IN, STERILE

## (undated) DEVICE — Device

## (undated) DEVICE — NEEDLE, HYPODERMIC, 25 G X 2 IN

## (undated) DEVICE — CUFF, TOURNIQUET, 18 X 4, DUAL PORT/SNGL BLADDER, DISP, LF

## (undated) DEVICE — DRAPE, SHEET, 17 X 23 IN